# Patient Record
Sex: FEMALE | Race: WHITE | NOT HISPANIC OR LATINO | Employment: OTHER | URBAN - METROPOLITAN AREA
[De-identification: names, ages, dates, MRNs, and addresses within clinical notes are randomized per-mention and may not be internally consistent; named-entity substitution may affect disease eponyms.]

---

## 2017-08-28 ENCOUNTER — HOSPITAL ENCOUNTER (EMERGENCY)
Facility: HOSPITAL | Age: 52
Discharge: HOME | End: 2017-08-28
Attending: EMERGENCY MEDICINE
Payer: MEDICARE

## 2017-08-28 VITALS
SYSTOLIC BLOOD PRESSURE: 103 MMHG | RESPIRATION RATE: 18 BRPM | OXYGEN SATURATION: 96 % | HEIGHT: 65 IN | TEMPERATURE: 97 F | WEIGHT: 118 LBS | BODY MASS INDEX: 19.66 KG/M2 | HEART RATE: 66 BPM | DIASTOLIC BLOOD PRESSURE: 63 MMHG

## 2017-08-28 DIAGNOSIS — H81.399 PERIPHERAL VERTIGO, UNSPECIFIED LATERALITY: ICD-10-CM

## 2017-08-28 DIAGNOSIS — R51.9 GENERALIZED HEADACHE: ICD-10-CM

## 2017-08-28 DIAGNOSIS — R42 VERTIGO: ICD-10-CM

## 2017-08-28 DIAGNOSIS — S09.90XA CLOSED HEAD INJURY, INITIAL ENCOUNTER: Primary | ICD-10-CM

## 2017-08-28 DIAGNOSIS — R55 SYNCOPE, UNSPECIFIED SYNCOPE TYPE: ICD-10-CM

## 2017-08-28 LAB
ALBUMIN SERPL BCP-MCNC: 3.6 G/DL
ALP SERPL-CCNC: 65 U/L
ALT SERPL W/O P-5'-P-CCNC: 31 U/L
ANION GAP SERPL CALC-SCNC: 9 MMOL/L
AST SERPL-CCNC: 23 U/L
BASOPHILS # BLD AUTO: 0.01 K/UL
BASOPHILS NFR BLD: 0.2 %
BILIRUB SERPL-MCNC: 0.8 MG/DL
BILIRUB UR QL STRIP: NEGATIVE
BUN SERPL-MCNC: 16 MG/DL
CALCIUM SERPL-MCNC: 9.2 MG/DL
CHLORIDE SERPL-SCNC: 106 MMOL/L
CLARITY UR: CLEAR
CO2 SERPL-SCNC: 24 MMOL/L
COLOR UR: NORMAL
CREAT SERPL-MCNC: 0.8 MG/DL
DIFFERENTIAL METHOD: ABNORMAL
EOSINOPHIL # BLD AUTO: 0.1 K/UL
EOSINOPHIL NFR BLD: 1.5 %
ERYTHROCYTE [DISTWIDTH] IN BLOOD BY AUTOMATED COUNT: 13.2 %
EST. GFR  (AFRICAN AMERICAN): >60 ML/MIN/1.73 M^2
EST. GFR  (NON AFRICAN AMERICAN): >60 ML/MIN/1.73 M^2
GLUCOSE SERPL-MCNC: 98 MG/DL
GLUCOSE UR QL STRIP: NEGATIVE
HCT VFR BLD AUTO: 38.8 %
HGB BLD-MCNC: 13.2 G/DL
HGB UR QL STRIP: NEGATIVE
KETONES UR QL STRIP: NEGATIVE
LEUKOCYTE ESTERASE UR QL STRIP: NEGATIVE
LYMPHOCYTES # BLD AUTO: 1.6 K/UL
LYMPHOCYTES NFR BLD: 29.9 %
MAGNESIUM SERPL-MCNC: 2.5 MG/DL
MCH RBC QN AUTO: 31.1 PG
MCHC RBC AUTO-ENTMCNC: 34 G/DL
MCV RBC AUTO: 91 FL
MONOCYTES # BLD AUTO: 0.9 K/UL
MONOCYTES NFR BLD: 16.4 %
NEUTROPHILS # BLD AUTO: 2.8 K/UL
NEUTROPHILS NFR BLD: 52 %
NITRITE UR QL STRIP: NEGATIVE
PH UR STRIP: 6 [PH] (ref 5–8)
PLATELET # BLD AUTO: 207 K/UL
PMV BLD AUTO: 11.7 FL
POTASSIUM SERPL-SCNC: 4 MMOL/L
PROT SERPL-MCNC: 6.9 G/DL
PROT UR QL STRIP: NEGATIVE
RBC # BLD AUTO: 4.25 M/UL
SODIUM SERPL-SCNC: 139 MMOL/L
SP GR UR STRIP: 1 (ref 1–1.03)
URN SPEC COLLECT METH UR: NORMAL
UROBILINOGEN UR STRIP-ACNC: NEGATIVE EU/DL
WBC # BLD AUTO: 5.42 K/UL

## 2017-08-28 PROCEDURE — 93005 ELECTROCARDIOGRAM TRACING: CPT

## 2017-08-28 PROCEDURE — 81003 URINALYSIS AUTO W/O SCOPE: CPT

## 2017-08-28 PROCEDURE — 85025 COMPLETE CBC W/AUTO DIFF WBC: CPT

## 2017-08-28 PROCEDURE — 80053 COMPREHEN METABOLIC PANEL: CPT

## 2017-08-28 PROCEDURE — 25000003 PHARM REV CODE 250: Performed by: EMERGENCY MEDICINE

## 2017-08-28 PROCEDURE — 63600175 PHARM REV CODE 636 W HCPCS: Performed by: EMERGENCY MEDICINE

## 2017-08-28 PROCEDURE — 99285 EMERGENCY DEPT VISIT HI MDM: CPT | Mod: 25

## 2017-08-28 PROCEDURE — 83735 ASSAY OF MAGNESIUM: CPT

## 2017-08-28 PROCEDURE — 96365 THER/PROPH/DIAG IV INF INIT: CPT

## 2017-08-28 PROCEDURE — 96375 TX/PRO/DX INJ NEW DRUG ADDON: CPT

## 2017-08-28 PROCEDURE — 96361 HYDRATE IV INFUSION ADD-ON: CPT

## 2017-08-28 RX ORDER — MECLIZINE HYDROCHLORIDE 25 MG/1
25 TABLET ORAL
Status: COMPLETED | OUTPATIENT
Start: 2017-08-28 | End: 2017-08-28

## 2017-08-28 RX ORDER — DIPHENHYDRAMINE HYDROCHLORIDE 50 MG/ML
25 INJECTION INTRAMUSCULAR; INTRAVENOUS
Status: COMPLETED | OUTPATIENT
Start: 2017-08-28 | End: 2017-08-28

## 2017-08-28 RX ORDER — BUTALBITAL, ACETAMINOPHEN AND CAFFEINE 50; 325; 40 MG/1; MG/1; MG/1
2 TABLET ORAL
Status: COMPLETED | OUTPATIENT
Start: 2017-08-28 | End: 2017-08-28

## 2017-08-28 RX ORDER — MECLIZINE HYDROCHLORIDE 25 MG/1
25 TABLET ORAL EVERY 6 HOURS PRN
Qty: 20 TABLET | Refills: 0 | Status: SHIPPED | OUTPATIENT
Start: 2017-08-28 | End: 2018-03-28

## 2017-08-28 RX ORDER — IBUPROFEN 400 MG/1
400 TABLET ORAL EVERY 6 HOURS PRN
Qty: 20 TABLET | Refills: 0 | Status: SHIPPED | OUTPATIENT
Start: 2017-08-28

## 2017-08-28 RX ORDER — PROCHLORPERAZINE MALEATE 10 MG
10 TABLET ORAL EVERY 6 HOURS PRN
Qty: 15 TABLET | Refills: 0 | Status: SHIPPED | OUTPATIENT
Start: 2017-08-28

## 2017-08-28 RX ADMIN — BUTALBITAL, ACETAMINOPHEN AND CAFFEINE 2 TABLET: 50; 325; 40 TABLET ORAL at 03:08

## 2017-08-28 RX ADMIN — DIPHENHYDRAMINE HYDROCHLORIDE 25 MG: 50 INJECTION, SOLUTION INTRAMUSCULAR; INTRAVENOUS at 12:08

## 2017-08-28 RX ADMIN — SODIUM CHLORIDE 1000 ML: 0.9 INJECTION, SOLUTION INTRAVENOUS at 12:08

## 2017-08-28 RX ADMIN — PROMETHAZINE HYDROCHLORIDE 12.5 MG: 25 INJECTION INTRAMUSCULAR; INTRAVENOUS at 12:08

## 2017-08-28 RX ADMIN — MECLIZINE HYDROCHLORIDE 25 MG: 25 TABLET ORAL at 12:08

## 2017-08-28 NOTE — ED TRIAGE NOTES
53 yo female comes in today with the cc of dizziness. Pt also states she is dehydrated. Pt denies any donta, and cp. Pt states she passed out early this morning.

## 2017-08-28 NOTE — ED PROVIDER NOTES
Encounter Date: 2017    SCRIBE #1 NOTE: I, Stephanie Padron, am scribing for, and in the presence of,  Keven Hanson MD. I have scribed the following portions of the note - Other sections scribed: ROS and HPI.       History     Chief Complaint   Patient presents with    Dizziness     States she is dizzy and dehydrated.  States the room feels like it is spinning    Dehydration     CC: Dizziness; Dehydration  HPI: This 52 y.o. female with a past medical history of Fibromyalgia; Kidney stone; Rheumatoid arthritis, presents to the ED complaining of an intermittent episodes of dizziness since this morning at 4 AM. She describes it as room spinning, worse with getting up from lying down. The patient's spouse found patient on a  floor this morning. Patient states she stood up and passed out. She notes hitting back of her head on a wooden floor. She is c/o severe and constant frontal headache. Spouse noted BP of 90/76. She denies any prior similar episode. She reports taking Robitussin last night for her productive cough with clear phelgm and sinus sx. She also notes receiving a steroid shot x1 week ago for her cold sx at her PCP office. She states she has not been eating well for last 2 days. She also reports intermittent nausea. She denies CP, SOB, cough, abdominal pain, V/D or any other associated sx. No prior medical intervention.      The history is provided by the patient and the spouse. No  was used.     Review of patient's allergies indicates:   Allergen Reactions    Codeine Nausea And Vomiting    Penicillins Hives     Past Medical History:   Diagnosis Date    Fibromyalgia     Kidney stone     history    Rheumatoid arthritis 2004    Rheumatoid arthritis      Past Surgical History:   Procedure Laterality Date     SECTION      x1     FOOT SURGERY  2014    HAND FUSION      Right thumb fusion    [facility]    MYOMECTOMY  age 41    TONSILLECTOMY  as an infant      Family History   Problem Relation Age of Onset    Lung cancer Maternal Grandfather     Diabetes Father     Pulmonary embolism Father     Colon cancer Mother     Hypertension Mother     Osteoporosis Mother     Breast cancer Neg Hx     Eclampsia Neg Hx     Miscarriages / Stillbirths Neg Hx     Ovarian cancer Neg Hx      labor Neg Hx     Stroke Neg Hx      Social History   Substance Use Topics    Smoking status: Never Smoker    Smokeless tobacco: Never Used    Alcohol use Yes      Comment: social     Review of Systems   Constitutional: Negative for chills and fever.   HENT: Positive for sneezing. Negative for congestion, ear pain, rhinorrhea and sore throat.    Eyes: Negative for pain and visual disturbance.   Respiratory: Positive for cough. Negative for shortness of breath.    Cardiovascular: Negative for chest pain.   Gastrointestinal: Negative for abdominal pain, diarrhea, nausea and vomiting.   Genitourinary: Negative for dysuria.   Musculoskeletal: Negative for back pain and neck pain.   Skin: Negative for rash.   Neurological: Positive for dizziness, syncope (unwitnessed) and headaches (frontal).       Physical Exam     Initial Vitals [17 1059]   BP Pulse Resp Temp SpO2   109/71 71 18 97.4 °F (36.3 °C) 97 %      MAP       83.67         Physical Exam  The patient was examined specifically for the following:   General:No significant distress, Good color, Warm and dry. Head and neck:Scalp atraumatic, Neck supple. Neurological:Appropriate conversation, Gross motor deficits. Eyes:Conjugate gaze, Clear corneas. ENT: No epistaxis. Cardiac: Regular rate and rhythm, Grossly normal heart tones. Pulmonary: Wheezing, Rales. Gastrointestinal: Abdominal tenderness, Abdominal distention. Musculoskeletal: Extremity deformity, Apparent pain with range of motion of the joints. Skin: Rash.   The findings on examination were normal except for the following: The patient's vertigo can be reproduced by  posture changes during the physical examination.  Tympanic membranes are clear.  Mental status examination, cranial nerves, motor and sensory examination grossly unremarkable.  There is no edema of the left calf.  There is no significant tenderness of the left.  The lungs are clear.  The heart tones are normal.  The abdomen is nontender.  The patient does not seem distressed.  Vital signs are stable.  ED Course   Procedures  Labs Reviewed   CBC W/ AUTO DIFFERENTIAL - Abnormal; Notable for the following:        Result Value    MCH 31.1 (*)     Mono% 16.4 (*)     All other components within normal limits   COMPREHENSIVE METABOLIC PANEL   URINALYSIS   MAGNESIUM     EKG Readings: (Independently Interpreted)   Patient's EKG reveals a normal sinus rhythm with a heart rate is 62.  The NE QRS and QT intervals are normal.  There is no evidence of acute myocardial infarction or malignant arrhythmia.  There are some nonspecific ST and T-wave abnormalities.       X-Rays:   Independently Interpreted Readings:   Other Readings:  CT of the head fails to reveal significant abnormalities.    Medical decision making: Given the above, this patient presents to the emergency room with dizziness.  She gives a report of symptoms of vertigo.  At the same time she gives a report of becoming syncopal in the [place] last night at 4 AM and falling to the ground.  She hit her head.  CT the head fails to reveal any evidence of intracranial bleeding.  Dizziness is the only symptom.  The vertigo can be reproduced during the physical examination.  I favor diagnosis of peripheral vertigo.  Patient's vital signs are stable here.  Laboratory work is unremarkable.  I will discharge this patient outpatient evaluation and treatment on meclizine and lots of liquids and follow-up with family medicine.             Scribe Attestation:   Scribe #1: I performed the above scribed service and the documentation accurately describes the services I performed. I attest  to the accuracy of the note.    Attending Attestation:           Physician Attestation for Scribe:  Physician Attestation Statement for Scribe #1: I, Keven Hanson MD, reviewed documentation, as scribed by Stephanie Padron in my presence, and it is both accurate and complete.                 ED Course     Clinical Impression:   The primary encounter diagnosis was Closed head injury, initial encounter. Diagnoses of Vertigo, Syncope, unspecified syncope type, Peripheral vertigo, unspecified laterality, and Generalized headache were also pertinent to this visit.                           Keven Hanson MD  08/30/17 0983

## 2017-08-28 NOTE — DISCHARGE INSTRUCTIONS
Please return immediately if you get worse or if new problems develop.  Please make an appointment to see your primary care doctor this week.  Rest.  Please hold on to something when you walk.  Please use meclizine for dizziness, ibuprofen for pain.  Compazine for headache and nausea.  Please take Benadryl 25-50 mg every time you use a Compazine tablet.  Rest.

## 2018-03-28 ENCOUNTER — OFFICE VISIT (OUTPATIENT)
Dept: OBSTETRICS AND GYNECOLOGY | Facility: CLINIC | Age: 53
End: 2018-03-28
Payer: MEDICARE

## 2018-03-28 VITALS
DIASTOLIC BLOOD PRESSURE: 78 MMHG | WEIGHT: 123 LBS | BODY MASS INDEX: 20.49 KG/M2 | HEIGHT: 65 IN | SYSTOLIC BLOOD PRESSURE: 112 MMHG

## 2018-03-28 DIAGNOSIS — Z01.419 ENCOUNTER FOR GYNECOLOGICAL EXAMINATION WITHOUT ABNORMAL FINDING: Primary | ICD-10-CM

## 2018-03-28 DIAGNOSIS — R10.2 FEMALE PELVIC PAIN: ICD-10-CM

## 2018-03-28 DIAGNOSIS — N95.1 MENOPAUSAL VASOMOTOR SYNDROME: ICD-10-CM

## 2018-03-28 DIAGNOSIS — Z12.31 SCREENING MAMMOGRAM, ENCOUNTER FOR: ICD-10-CM

## 2018-03-28 PROCEDURE — G0101 CA SCREEN;PELVIC/BREAST EXAM: HCPCS | Mod: S$GLB,,, | Performed by: OBSTETRICS & GYNECOLOGY

## 2018-03-28 PROCEDURE — 99999 PR PBB SHADOW E&M-EST. PATIENT-LVL III: CPT | Mod: PBBFAC,,, | Performed by: OBSTETRICS & GYNECOLOGY

## 2018-03-28 RX ORDER — METHOTREXATE 2.5 MG/1
TABLET ORAL
Refills: 5 | COMMUNITY
Start: 2018-02-09

## 2018-03-28 RX ORDER — ESTRADIOL 2 MG/1
2 TABLET ORAL DAILY
Qty: 30 TABLET | Refills: 11 | Status: SHIPPED | OUTPATIENT
Start: 2018-03-28 | End: 2019-05-02 | Stop reason: SDUPTHER

## 2018-03-28 NOTE — PROGRESS NOTES
Subjective:       Patient ID: Sayra Pearson is a 53 y.o. female.    Chief Complaint:  Well Woman (pt c/o on and off abdominal pains)      History of Present Illness  HPI  Sayra Pearson is a 53 y.o. female  here for her annual GYN exam. She has not been seen for about 2 years.  She reports severe hot flashes for the past year when she ran out of her estradiol, but had started having hot flashes again even on the 1 mg of estradiol.  denies vaginal itching or irritation.  Denies vaginal discharge.  She is sexually active. She has had 1 partner for 10 years .   History of abnormal pap: No  Last Pap: was normal  Last MMG: normal--routine follow-up in 12 months  Last Colonoscopy: 3 years ago: NORMAL  denies domestic violence. She does feel safe at home.     Past Medical History:   Diagnosis Date    Fibromyalgia     Kidney stone     history    Rheumatoid arthritis     Rheumatoid arthritis      Past Surgical History:   Procedure Laterality Date     SECTION      x1     FOOT SURGERY  2014    HAND FUSION      Right thumb fusion    [facility]CTOMY  2011    [facility]    MYOMECTOMY  age 41    TONSILLECTOMY  as an infant     Social History     Social History    Marital status:      Spouse name: N/A    Number of children: N/A    Years of education: N/A     Occupational History    Not on file.     Social History Main Topics    Smoking status: Never Smoker    Smokeless tobacco: Never Used    Alcohol use Yes      Comment: social    Drug use: No    Sexual activity: Yes     Partners: Male     Birth control/ protection: Surgical      Comment:  since      Other Topics Concern    Not on file     Social History Narrative    No narrative on file     Family History   Problem Relation Age of Onset    Lung cancer Maternal Grandfather     Diabetes Father     Pulmonary embolism Father     Colon cancer Mother     Hypertension Mother     Osteoporosis Mother     Breast cancer  "Neg Hx     Eclampsia Neg Hx     Miscarriages / Stillbirths Neg Hx     Ovarian cancer Neg Hx      labor Neg Hx     Stroke Neg Hx      OB History      Para Term  AB Living    3 1 1 0 2 1    SAB TAB Ectopic Multiple Live Births    2 0 0 0 1          /78   Ht 5' 5" (1.651 m)   Wt 55.8 kg (123 lb 0.3 oz)   LMP  (LMP Unknown)   BMI 20.47 kg/m²         GYN & OB History    Date of Last Pap: No result found    OB History    Para Term  AB Living   3 1 1 0 2 1   SAB TAB Ectopic Multiple Live Births   2 0 0 0 1      # Outcome Date GA Lbr Anthony/2nd Weight Sex Delivery Anes PTL Lv   3 SAB            2 SAB            1 Term      CS-LTranv   CINDY          Review of Systems  Review of Systems   Constitutional: Negative for activity change, appetite change, fatigue and unexpected weight change.   HENT: Negative.    Eyes: Negative for visual disturbance.   Respiratory: Negative for shortness of breath and wheezing.    Cardiovascular: Negative for chest pain, palpitations and leg swelling.   Gastrointestinal: Negative for abdominal pain, bloating and blood in stool.   Endocrine: Positive for hot flashes. Negative for diabetes and hair loss.   Genitourinary: Positive for decreased libido, dyspareunia and pelvic pain.   Musculoskeletal: Negative for back pain and joint swelling.   Skin:  Negative for no acne and hair changes.   Neurological: Negative for headaches.   Hematological: Does not bruise/bleed easily.   Psychiatric/Behavioral: Positive for sleep disturbance. Negative for depression. The patient is not nervous/anxious.    Breast: Negative for breast pain and nipple discharge          Objective:    Physical Exam:   Constitutional: She is oriented to person, place, and time. She appears well-developed and well-nourished.    HENT:   Head: Normocephalic and atraumatic.    Eyes: EOM are normal. Pupils are equal, round, and reactive to light.    Neck: Normal range of motion. Neck supple.  "   Cardiovascular: Normal rate and regular rhythm.     Pulmonary/Chest: Effort normal and breath sounds normal.   BREASTS: Symmetrical, no skin changes or visible lesions.  No palpable masses, nipple discharge bilaterally.          Abdominal: Soft. Bowel sounds are normal.     Genitourinary:   Genitourinary Comments: PELVIC: Normal external female genitalia without lesions. Normal hair distribution. Adequate perineal body, normal urethral meatus. Vagina atrophic without lesions or discharge. No significant cystocele or rectocele. Bimanual exam shows uterus and cervix to be surgically absent. Adnexa without masses or tenderness.  RECTAL: Deferred             Musculoskeletal: Normal range of motion and moves all extremeties.       Neurological: She is alert and oriented to person, place, and time.    Skin: Skin is warm and dry.    Psychiatric: She has a normal mood and affect.          Assessment:        1. Encounter for gynecological examination without abnormal finding    2. Screening mammogram, encounter for    3. Female pelvic pain    4. Menopausal vasomotor syndrome               Plan:      1. Encounter for gynecological examination without abnormal finding  COUNSELING:  The patient was counseled today on osteoporosis prevention, calcium supplementation, and regular weight bearing exercise. The patient was also counseled today on ACS PAP guidelines, with recommendations for yearly pelvic exams unless their uterus, cervix, and ovaries were removed for benign reasons; in that case, examinations every 3-5 years are recommended. The patient was also counseled regarding monthly breast self-examination, routine STD screening for at-risk populations, prophylactic immunizations for transmitted infections such as HPV, Pertussis, or Influenza as appropriate, and yearly mammograms when indicated by ACS guidelines. She was advised to see her primary care physician for all other health maintenance.   FOLLOW-UP with me for next  routine visit.         2. Screening mammogram, encounter for    - Mammo Digital Screening Bilat with Tomosynthesis CAD; Future    3. Female pelvic pain    - US Pelvis Comp with Transvag NON-OB (xpd; Future    4. Menopausal vasomotor syndrome    - estradiol (ESTRACE) 2 MG tablet; Take 1 tablet (2 mg total) by mouth once daily.  Dispense: 30 tablet; Refill: 11       Follow-up in about 1 year (around 3/28/2019).

## 2018-04-09 ENCOUNTER — HOSPITAL ENCOUNTER (OUTPATIENT)
Dept: RADIOLOGY | Facility: HOSPITAL | Age: 53
Discharge: HOME | End: 2018-04-09
Attending: OBSTETRICS & GYNECOLOGY
Payer: MEDICARE

## 2018-04-09 DIAGNOSIS — R10.2 FEMALE PELVIC PAIN: ICD-10-CM

## 2018-04-09 PROCEDURE — 76830 TRANSVAGINAL US NON-OB: CPT | Mod: 26,,, | Performed by: RADIOLOGY

## 2018-04-09 PROCEDURE — 76856 US EXAM PELVIC COMPLETE: CPT | Mod: 26,,, | Performed by: RADIOLOGY

## 2018-04-09 PROCEDURE — 76830 TRANSVAGINAL US NON-OB: CPT | Mod: TC

## 2018-08-18 ENCOUNTER — HOSPITAL ENCOUNTER (EMERGENCY)
Facility: HOSPITAL | Age: 53
Discharge: HOME | End: 2018-08-18
Attending: EMERGENCY MEDICINE
Payer: MEDICARE

## 2018-08-18 VITALS
RESPIRATION RATE: 16 BRPM | SYSTOLIC BLOOD PRESSURE: 100 MMHG | TEMPERATURE: 99 F | HEIGHT: 65 IN | DIASTOLIC BLOOD PRESSURE: 63 MMHG | OXYGEN SATURATION: 99 % | HEART RATE: 63 BPM | BODY MASS INDEX: 20.83 KG/M2 | WEIGHT: 125 LBS

## 2018-08-18 DIAGNOSIS — S42.211A CLOSED DISPLACED FRACTURE OF SURGICAL NECK OF RIGHT HUMERUS, UNSPECIFIED FRACTURE MORPHOLOGY, INITIAL ENCOUNTER: Primary | ICD-10-CM

## 2018-08-18 DIAGNOSIS — M25.519 SHOULDER PAIN: ICD-10-CM

## 2018-08-18 LAB — POCT GLUCOSE: 118 MG/DL (ref 70–110)

## 2018-08-18 PROCEDURE — 99284 EMERGENCY DEPT VISIT MOD MDM: CPT | Mod: 25

## 2018-08-18 PROCEDURE — 25000003 PHARM REV CODE 250: Performed by: PHYSICIAN ASSISTANT

## 2018-08-18 PROCEDURE — 96372 THER/PROPH/DIAG INJ SC/IM: CPT

## 2018-08-18 PROCEDURE — 82962 GLUCOSE BLOOD TEST: CPT

## 2018-08-18 PROCEDURE — 63600175 PHARM REV CODE 636 W HCPCS: Performed by: PHYSICIAN ASSISTANT

## 2018-08-18 RX ORDER — ONDANSETRON 8 MG/1
8 TABLET, ORALLY DISINTEGRATING ORAL
Status: COMPLETED | OUTPATIENT
Start: 2018-08-18 | End: 2018-08-18

## 2018-08-18 RX ORDER — MORPHINE SULFATE 4 MG/ML
5 INJECTION, SOLUTION INTRAMUSCULAR; INTRAVENOUS ONCE
Status: COMPLETED | OUTPATIENT
Start: 2018-08-18 | End: 2018-08-18

## 2018-08-18 RX ORDER — OXYCODONE AND ACETAMINOPHEN 5; 325 MG/1; MG/1
1 TABLET ORAL EVERY 4 HOURS PRN
Qty: 20 TABLET | Refills: 0 | Status: SHIPPED | OUTPATIENT
Start: 2018-08-18 | End: 2018-08-23

## 2018-08-18 RX ORDER — ONDANSETRON 4 MG/1
4 TABLET, ORALLY DISINTEGRATING ORAL EVERY 6 HOURS PRN
Qty: 15 TABLET | Refills: 0 | Status: SHIPPED | OUTPATIENT
Start: 2018-08-18 | End: 2018-08-23

## 2018-08-18 RX ORDER — ONDANSETRON 4 MG/1
8 TABLET, FILM COATED ORAL ONCE
Status: DISCONTINUED | OUTPATIENT
Start: 2018-08-18 | End: 2018-08-18

## 2018-08-18 RX ADMIN — ONDANSETRON 8 MG: 8 TABLET, ORALLY DISINTEGRATING ORAL at 01:08

## 2018-08-18 RX ADMIN — MORPHINE SULFATE 5 MG: 4 INJECTION INTRAVENOUS at 12:08

## 2018-08-18 RX ADMIN — SODIUM CHLORIDE 1000 ML: 0.9 INJECTION, SOLUTION INTRAVENOUS at 01:08

## 2018-08-18 NOTE — ED PROVIDER NOTES
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  complaints of right shoulder pain.  denies hitting head.  denies loc.     CC: Fall    HPI:   Patient is a 53-year-old female with history of rheumatoid arthritis fibromyalgia presenting for evaluation of right shoulder pain status post mechanical slip and fall that occurred just prior to arrival.  Patient reports she slipped on the ground and fell directly onto her right shoulder.  Denies head injury, LOC, nausea, vomiting, headache. Patient reports constant right shoulder pain since the incident 5/10, 10/10 with movement. No attempted tx. Denies CP, SOB, lightheadedness or dizziness preceding fall, weakness or paresthesias.           Review of patient's allergies indicates:   Allergen Reactions    Codeine Nausea And Vomiting    Penicillins Hives     Past Medical History:   Diagnosis Date    Fibromyalgia     Kidney stone     history    Rheumatoid arthritis 2004    Rheumatoid arthritis      Past Surgical History:   Procedure Laterality Date     SECTION      x1     FOOT SURGERY  2014    HAND FUSION      Right thumb fusion    [facility]OMY  2011    [facility]    MYOMECTOMY  age 41    TONSILLECTOMY  as an infant     Family History   Problem Relation Age of Onset    Lung cancer Maternal Grandfather     Diabetes Father     Pulmonary embolism Father     Colon cancer Mother     Hypertension Mother     Osteoporosis Mother     Breast cancer Neg Hx     Eclampsia Neg Hx     Miscarriages / Stillbirths Neg Hx     Ovarian cancer Neg Hx      labor Neg Hx     Stroke Neg Hx      Social History     Tobacco Use    Smoking status: Never Smoker    Smokeless tobacco: Never Used   Substance Use Topics    Alcohol use: Yes     Comment: social    Drug use: No     Review of Systems   Eyes: Negative for redness.   Respiratory: Negative for shortness of breath.     Cardiovascular: Negative for chest pain.   Gastrointestinal: Negative for abdominal pain, nausea and vomiting.   Musculoskeletal: Negative for back pain and neck pain.        (+) R shoulder pain     Skin: Negative for wound.   Neurological: Negative for dizziness, weakness, light-headedness, numbness and headaches.       Physical Exam     Initial Vitals [08/18/18 1125]   BP Pulse Resp Temp SpO2   97/72 60 16 98.1 °F (36.7 °C) 97 %      MAP       --         Physical Exam    Nursing note and vitals reviewed.  Constitutional: She appears well-developed and well-nourished. No distress.   HENT:   Head: Normocephalic.   Right Ear: External ear normal.   Left Ear: External ear normal.   Eyes: Conjunctivae are normal.   Cardiovascular: Normal rate and regular rhythm. Exam reveals no gallop and no friction rub.    No murmur heard.  Pulses:       Radial pulses are 2+ on the right side.   Pulmonary/Chest: Breath sounds normal. No respiratory distress. She has no wheezes. She has no rhonchi. She has no rales.   Musculoskeletal:   Pt holding R shoulder adducted and hesitant to move due to pain. No obvious deformity. Generalized TTP over the R shoulder. No TTP of elbow, forearm, wrist, hand. Normal ROM of digits of R hand.    Neurological: She is alert. No sensory deficit.   Skin: Skin is warm and dry.   Psychiatric: She has a normal mood and affect.         ED Course   Orthopedic Injury  Date/Time: 8/18/2018 8:39 PM  Performed by: Shawna Shay PA-C  Authorized by: Keven Hanson MD     Location procedure was performed:  [facility] EMERGENCY DEPARTMENT  Pre-operative diagnosis:  Fracture R humerus surgical neck  Injury:     Injury location:  Shoulder    Location details:  Right shoulder    Injury type:  Fracture      Pre-procedure assessment:     Neurovascular status: Neurovascularly intact      Distal perfusion: normal      Neurological function: normal      Range of motion: reduced        Selections made in this section  will also lock the Injury type section above.:     Immobilization:  Sling (swathe)  Post-procedure assessment:     Neurovascular status: Neurovascularly intact      Distal perfusion: normal      Neurological function: normal      Range of motion: splinted      Patient tolerance:  Patient tolerated the procedure well with no immediate complications      Labs Reviewed   POCT GLUCOSE - Abnormal; Notable for the following components:       Result Value    POCT Glucose 118 (*)     All other components within normal limits          Imaging Results          X-Ray Shoulder Complete 2 View Right (Final result)  Result time 08/18/18 13:00:06    Final result by Rock Chaney MD (08/18/18 13:00:06)                 Impression:      Comminuted impaction fracture of the right humerus surgical neck.      Electronically signed by: Rock Chaney MD  Date:    08/18/2018  Time:    13:00             Narrative:    EXAMINATION:  XR SHOULDER COMPLETE 2 OR MORE VIEWS RIGHT    CLINICAL HISTORY:  Pain in unspecified shoulder    TECHNIQUE:  Two or three views of the right shoulder were performed.    COMPARISON:  None    FINDINGS:  There is a comminuted impaction fracture of the right humerus surgical neck with slight medial displacement of the major distal fracture fragment.    Glenohumeral joint is intact.  Acromioclavicular joint is intact.  No suspicious bone lesion.  Unremarkable soft tissues.                                 Medical Decision Making:   Initial Assessment:   51-year-old female with history of rheumatoid arthritis fibromyalgia not on blood thinners presenting for evaluation of right shoulder pain after slip and fall prior to arrival. Denies head injury. Exam as above. No neurovascular deficits. X-ray remarkable for comminuted impaction fracture of right humerus surgical neck.  Patient given morphine in the ED with improvement of her pain.Sling and Swathe placed. Pt had near syncopal episode during which she became  lightheaded and diaphoretic. She states she has not eaten all day and pain medication makes her nauseous. Denies CP, SOB, difficulty breathing. Pt BP lower limits of normal initially. Slightly hypotensive during this episode. Glucose 118. Pt given IVF and crackers with improvement of symptoms. Patient provided with copy of x-ray on disc. Ortho follow up in 2 days. ER return precautions discussed including worsening symptoms or as needed. Discussed pt with Dr. Montero who agrees with assessment and plan.                       Clinical Impression:   The primary encounter diagnosis was Closed displaced fracture of surgical neck of right humerus, unspecified fracture morphology, initial encounter. A diagnosis of Shoulder pain was also pertinent to this visit.                             Shawna Shay PA-C  08/18/18 1837       Shawna Shay PA-C  08/18/18 2040

## 2018-08-18 NOTE — DISCHARGE INSTRUCTIONS
Take Percocet as needed for pain. Follow up with Orthopedics in 2 days. Return to ER for worsening symptoms, weakness, numbness tingling or as needed.     If you would like to follow up with the [facility] for further care of your  fracture, please call the [facility] Scheduling Department at [phone]  during business hours. Please let the  know you need a fracture follow-up  appointment with Orthopedics, and you will be scheduled in the Orthopedic Clinic.  Please bring your original Emergency Department discharge papers and disc with you to the  clinic appointment.

## 2018-08-18 NOTE — ED TRIAGE NOTES
Pt c/o RIGHT shoulder pain after a fall this AM. Pt states she was mopping when she slipped on the wet floor and landed on right shoulder. Denies hitting head or any other injury from fall. Pain is 9/10. Denies taking pain meds PTA

## 2019-05-02 DIAGNOSIS — N95.1 MENOPAUSAL VASOMOTOR SYNDROME: ICD-10-CM

## 2019-05-03 RX ORDER — ESTRADIOL 2 MG/1
TABLET ORAL
Qty: 30 TABLET | Refills: 1 | Status: SHIPPED | OUTPATIENT
Start: 2019-05-03 | End: 2019-06-05 | Stop reason: SDUPTHER

## 2019-06-05 DIAGNOSIS — N95.1 MENOPAUSAL VASOMOTOR SYNDROME: ICD-10-CM

## 2019-06-05 RX ORDER — ESTRADIOL 2 MG/1
TABLET ORAL
Qty: 30 TABLET | Refills: 1 | Status: SHIPPED | OUTPATIENT
Start: 2019-06-05 | End: 2019-08-29 | Stop reason: SDUPTHER

## 2019-08-29 DIAGNOSIS — N95.1 MENOPAUSAL VASOMOTOR SYNDROME: ICD-10-CM

## 2019-08-29 RX ORDER — ESTRADIOL 2 MG/1
TABLET ORAL
Qty: 30 TABLET | Refills: 0 | Status: SHIPPED | OUTPATIENT
Start: 2019-08-29 | End: 2020-03-16

## 2020-03-16 DIAGNOSIS — N95.1 MENOPAUSAL VASOMOTOR SYNDROME: ICD-10-CM

## 2020-03-16 RX ORDER — ESTRADIOL 2 MG/1
TABLET ORAL
Qty: 30 TABLET | Refills: 1 | Status: SHIPPED | OUTPATIENT
Start: 2020-03-16 | End: 2020-08-06 | Stop reason: SDUPTHER

## 2020-08-06 ENCOUNTER — LAB VISIT (OUTPATIENT)
Dept: LAB | Facility: OTHER | Age: 55
End: 2020-08-06
Attending: OBSTETRICS & GYNECOLOGY
Payer: MEDICARE

## 2020-08-06 ENCOUNTER — TELEPHONE (OUTPATIENT)
Dept: OBSTETRICS AND GYNECOLOGY | Facility: CLINIC | Age: 55
End: 2020-08-06

## 2020-08-06 ENCOUNTER — OFFICE VISIT (OUTPATIENT)
Dept: OBSTETRICS AND GYNECOLOGY | Facility: CLINIC | Age: 55
End: 2020-08-06
Attending: OBSTETRICS & GYNECOLOGY
Payer: MEDICARE

## 2020-08-06 VITALS
SYSTOLIC BLOOD PRESSURE: 108 MMHG | BODY MASS INDEX: 20.68 KG/M2 | WEIGHT: 124.13 LBS | DIASTOLIC BLOOD PRESSURE: 66 MMHG | HEIGHT: 65 IN

## 2020-08-06 DIAGNOSIS — Z01.419 ENCOUNTER FOR GYNECOLOGICAL EXAMINATION WITHOUT ABNORMAL FINDING: Primary | ICD-10-CM

## 2020-08-06 DIAGNOSIS — Z78.0 MENOPAUSE PRESENT: ICD-10-CM

## 2020-08-06 DIAGNOSIS — N95.1 MENOPAUSAL SYNDROME: ICD-10-CM

## 2020-08-06 DIAGNOSIS — L29.0 PERIANAL IRRITATION: ICD-10-CM

## 2020-08-06 DIAGNOSIS — Z12.31 SCREENING MAMMOGRAM, ENCOUNTER FOR: ICD-10-CM

## 2020-08-06 DIAGNOSIS — N94.10 FEMALE DYSPAREUNIA: ICD-10-CM

## 2020-08-06 DIAGNOSIS — N95.1 MENOPAUSAL VASOMOTOR SYNDROME: ICD-10-CM

## 2020-08-06 DIAGNOSIS — N95.2 POSTMENOPAUSAL ATROPHIC VAGINITIS: ICD-10-CM

## 2020-08-06 LAB — ESTRADIOL SERPL-MCNC: 13 PG/ML

## 2020-08-06 PROCEDURE — G0101 CA SCREEN;PELVIC/BREAST EXAM: HCPCS | Mod: S$GLB,,, | Performed by: OBSTETRICS & GYNECOLOGY

## 2020-08-06 PROCEDURE — 84402 ASSAY OF FREE TESTOSTERONE: CPT

## 2020-08-06 PROCEDURE — 36415 COLL VENOUS BLD VENIPUNCTURE: CPT

## 2020-08-06 PROCEDURE — 3008F PR BODY MASS INDEX (BMI) DOCUMENTED: ICD-10-PCS | Mod: CPTII,S$GLB,, | Performed by: OBSTETRICS & GYNECOLOGY

## 2020-08-06 PROCEDURE — 82670 ASSAY OF TOTAL ESTRADIOL: CPT

## 2020-08-06 PROCEDURE — 99999 PR PBB SHADOW E&M-EST. PATIENT-LVL III: CPT | Mod: PBBFAC,,, | Performed by: OBSTETRICS & GYNECOLOGY

## 2020-08-06 PROCEDURE — G0101 PR CA SCREEN;PELVIC/BREAST EXAM: ICD-10-PCS | Mod: S$GLB,,, | Performed by: OBSTETRICS & GYNECOLOGY

## 2020-08-06 PROCEDURE — 3008F BODY MASS INDEX DOCD: CPT | Mod: CPTII,S$GLB,, | Performed by: OBSTETRICS & GYNECOLOGY

## 2020-08-06 PROCEDURE — 99999 PR PBB SHADOW E&M-EST. PATIENT-LVL III: ICD-10-PCS | Mod: PBBFAC,,, | Performed by: OBSTETRICS & GYNECOLOGY

## 2020-08-06 RX ORDER — CLOTRIMAZOLE 1 %
CREAM (GRAM) TOPICAL
Qty: 30 G | Refills: 1 | Status: SHIPPED | OUTPATIENT
Start: 2020-08-06 | End: 2021-08-06

## 2020-08-06 RX ORDER — ESTRADIOL 2 MG/1
2 TABLET ORAL DAILY
Qty: 30 TABLET | Refills: 11 | Status: SHIPPED | OUTPATIENT
Start: 2020-08-06 | End: 2020-08-13 | Stop reason: ALTCHOICE

## 2020-08-06 RX ORDER — TESTOSTERONE 50 MG/5G
0.5 GEL TRANSDERMAL DAILY
Qty: 30 G | Refills: 3 | Status: SHIPPED | OUTPATIENT
Start: 2020-08-06 | End: 2021-05-06 | Stop reason: SDUPTHER

## 2020-08-06 RX ORDER — ESTRADIOL 0.1 MG/G
0.5 CREAM VAGINAL DAILY
Qty: 42 G | Refills: 4 | Status: SHIPPED | OUTPATIENT
Start: 2020-08-06 | End: 2021-08-06

## 2020-08-06 NOTE — TELEPHONE ENCOUNTER
Called pt and scheduled US appt as requested per Dr Zavala. Also put pts AVS in the mail as requested per pt.

## 2020-08-06 NOTE — PROGRESS NOTES
Subjective:       Patient ID: Sayra Pearson is a 55 y.o. female.    Chief Complaint:  Annual Exam      History of Present Illness  HPI  Sayra Pearson is a 55 y.o. female  here for her annual GYN exam.   She reports that her hot flashes have improved, but still having them about once daily (used to be every  15 minutes prior to beginning Estrace tablets).However, her dyspareunia and vaginal dryness have not improved, and lubricants are not working adequately.   denies vaginal itching or irritation.  Denies vaginal discharge.  She is sexually active. She has had 1 partner for the past 12 years .   History of abnormal pap: No  Last Pap: was normal  Last MMG: normal-:July at [facility]-routine follow-up in 12 months  Last Colonoscopy:  N/A  denies domestic violence. She does feel safe at home.     Past Medical History:   Diagnosis Date    Fibromyalgia     Kidney stone     history    Rheumatoid arthritis     Rheumatoid arthritis      Past Surgical History:   Procedure Laterality Date     SECTION      x1     FOOT SURGERY  2014    HAND FUSION      Right thumb fusion    HYSTERECTOMY  2011    [facility]    MYOMECTOMY  age 41    TONSILLECTOMY  as an infant     Social History     Socioeconomic History    Marital status:      Spouse name: Not on file    Number of children: Not on file    Years of education: Not on file    Highest education level: Not on file   Occupational History    Not on file   Social Needs    Financial resource strain: Not on file    Food insecurity     Worry: Not on file     Inability: Not on file    Transportation needs     Medical: Not on file     Non-medical: Not on file   Tobacco Use    Smoking status: Never Smoker    Smokeless tobacco: Never Used   Substance and Sexual Activity    Alcohol use: Yes     Comment: social    Drug use: No    Sexual activity: Yes     Partners: Male     Birth control/protection: Surgical     Comment:  since   "  Lifestyle    Physical activity     Days per week: Not on file     Minutes per session: Not on file    Stress: Not on file   Relationships    Social connections     Talks on phone: Not on file     Gets together: Not on file     Attends [employer] service: Not on file     Active member of club or organization: Not on file     Attends meetings of clubs or organizations: Not on file     Relationship status: Not on file   Other Topics Concern    Not on file   Social History Narrative    Not on file     Family History   Problem Relation Age of Onset    Lung cancer Maternal Grandfather     Diabetes Father     Pulmonary embolism Father     Colon cancer Mother     Hypertension Mother     Osteoporosis Mother     Breast cancer Neg Hx     Eclampsia Neg Hx     Miscarriages / Stillbirths Neg Hx     Ovarian cancer Neg Hx      labor Neg Hx     Stroke Neg Hx      OB History        3    Para   1    Term   1       0    AB   2    Living   1       SAB   2    TAB   0    Ectopic   0    Multiple   0    Live Births   1                 /66   Ht 5' 5" (1.651 m)   Wt 56.3 kg (124 lb 1.9 oz)   LMP  (LMP Unknown)   BMI 20.65 kg/m²         GYN & OB History    Date of Last Pap: No result found    OB History    Para Term  AB Living   3 1 1 0 2 1   SAB TAB Ectopic Multiple Live Births   2 0 0 0 1      # Outcome Date GA Lbr Anthony/2nd Weight Sex Delivery Anes PTL Lv   3 SAB            2 SAB            1 Term      CS-LTranv   CINDY       Review of Systems  Review of Systems   Constitutional: Negative for activity change, appetite change, fatigue and unexpected weight change.   HENT: Negative.    Eyes: Negative for visual disturbance.   Respiratory: Negative for shortness of breath and wheezing.    Cardiovascular: Negative for chest pain, palpitations and leg swelling.   Gastrointestinal: Positive for constipation. Negative for abdominal pain, bloating and blood in stool.   Endocrine: Positive " for hot flashes. Negative for diabetes and hair loss.   Genitourinary: Positive for decreased libido, dyspareunia, hot flashes and vaginal dryness.   Musculoskeletal: Negative for back pain and joint swelling.   Integumentary:  Negative for acne, hair changes and nipple discharge.   Neurological: Negative for headaches.   Hematological: Does not bruise/bleed easily.   Psychiatric/Behavioral: Negative for depression and sleep disturbance. The patient is not nervous/anxious.    Breast: Negative for mastodynia and nipple discharge          Objective:      Physical Exam:   Constitutional: She is oriented to person, place, and time. She appears well-developed and well-nourished.    HENT:   Head: Normocephalic and atraumatic.    Eyes: Pupils are equal, round, and reactive to light. EOM are normal.    Neck: Normal range of motion. Neck supple.    Cardiovascular: Normal rate and regular rhythm.     Pulmonary/Chest: Effort normal and breath sounds normal.   BREASTS:  no mass, no tenderness, no deformity and no retraction. Right breast exhibits no inverted nipple, no mass, no nipple discharge, no skin change, no tenderness, no bleeding and no swelling. Left breast exhibits no inverted nipple, no mass, no nipple discharge, no skin change, no tenderness, no bleeding and no swelling. Breasts are symmetrical.              Abdominal: Soft. Bowel sounds are normal.     Genitourinary:    Pelvic exam was performed with patient supine.      Genitourinary Comments: PELVIC: Normal external female genitalia without lesions. Normal hair distribution. Adequate perineal body, normal urethral meatus. Vagina with Narrow introitus, atrophic without lesions or discharge. No significant cystocele or rectocele. Bimanual exam shows uterus and cervix to be surgically absent. Adnexa without masses or tenderness.(Fullness in culdesac, ? Stool vs. Mass effect).   RECTAL: Deferred (Mild periananal erythema)               Musculoskeletal: Normal range  of motion and moves all extremeties.       Neurological: She is alert and oriented to person, place, and time.    Skin: Skin is warm and dry.    Psychiatric: She has a normal mood and affect.              Assessment:        1. Encounter for gynecological examination without abnormal finding    2. Menopause present    3. Female dyspareunia    4. Postmenopausal atrophic vaginitis    5. Menopausal syndrome    6. Perianal irritation    7. Menopausal vasomotor syndrome    8. Screening mammogram, encounter for                Plan:      1. Encounter for gynecological examination without abnormal finding  COUNSELING:  The patient was counseled today on regular weight bearing exercise. Patient was counseled today on the new ACS guidelines for cervical cytology screening as well as the current recommendations for breast cancer screening. Counseling session lasted approximately 10 minutes, and all her questions were answered. She was advised to see her primary care physician for all other health maintenance.   FOLLOW-UP with me for next routine visit.         2. Menopause present    3. Female dyspareunia    - estradioL (ESTRACE) 0.01 % (0.1 mg/gram) vaginal cream; Place 0.5 g vaginally once daily. Use nightly x 2 weeks then every other day.  Dispense: 42 g; Refill: 4  - US Pelvis Comp with Transvag NON-OB (xpd; Future    4. Postmenopausal atrophic vaginitis    - estradioL (ESTRACE) 0.01 % (0.1 mg/gram) vaginal cream; Place 0.5 g vaginally once daily. Use nightly x 2 weeks then every other day.  Dispense: 42 g; Refill: 4    5. Menopausal syndrome    - Estradiol; Future  - Testosterone, Free; Future  - testosterone (TESTIM) 50 mg/5 gram (1 %) Gel; Apply 0.5 g topically once daily.  Dispense: 30 g; Refill: 3(compounded product called in to [facility])    6. Perianal irritation    - clotrimazole (LOTRIMIN) 1 % cream; Apply to affected area 2 times daily  Dispense: 30 g; Refill: 1    7. Menopausal vasomotor syndrome    - estradioL  (ESTRACE) 2 MG tablet; Take 1 tablet (2 mg total) by mouth once daily.  Dispense: 30 tablet; Refill: 11    8. Screening mammogram, encounter for  - Mammo Digital Screening Bilat w/ Jorge; Future       Follow up in about 1 year (around 8/6/2021), or if symptoms worsen or fail to improve.

## 2020-08-06 NOTE — TELEPHONE ENCOUNTER
----- Message from Katt Blue sent at 8/6/2020 11:59 AM CDT -----  Regarding: Patient call back  Who called:KIM MEDNEZ [[phone]]    What is the request in detail: Patient is requesting a call back. She would like to know if she left too early to receive her paperwork following the visit. She states if it is no longer protocol, she does not need the paperwork. She just wanted to make sure she didn't leave anything behind  Please advise.    Can the clinic reply by [facility]? No    Best call back number: [phone]    Additional Information: N/A

## 2020-08-10 ENCOUNTER — HOSPITAL ENCOUNTER (OUTPATIENT)
Dept: RADIOLOGY | Facility: HOSPITAL | Age: 55
Discharge: HOME | End: 2020-08-10
Attending: OBSTETRICS & GYNECOLOGY
Payer: MEDICARE

## 2020-08-10 DIAGNOSIS — N94.10 FEMALE DYSPAREUNIA: ICD-10-CM

## 2020-08-10 LAB — TESTOST FREE SERPL-MCNC: 0.4 PG/ML

## 2020-08-10 PROCEDURE — 76830 TRANSVAGINAL US NON-OB: CPT | Mod: 26,,, | Performed by: RADIOLOGY

## 2020-08-10 PROCEDURE — 76856 US EXAM PELVIC COMPLETE: CPT | Mod: 26,,, | Performed by: RADIOLOGY

## 2020-08-10 PROCEDURE — 76856 US PELVIS COMP WITH TRANSVAG NON-OB (XPD): ICD-10-PCS | Mod: 26,,, | Performed by: RADIOLOGY

## 2020-08-10 PROCEDURE — 76830 US PELVIS COMP WITH TRANSVAG NON-OB (XPD): ICD-10-PCS | Mod: 26,,, | Performed by: RADIOLOGY

## 2020-08-10 PROCEDURE — 76830 TRANSVAGINAL US NON-OB: CPT | Mod: TC

## 2020-08-12 ENCOUNTER — PATIENT MESSAGE (OUTPATIENT)
Dept: OBSTETRICS AND GYNECOLOGY | Facility: CLINIC | Age: 55
End: 2020-08-12

## 2020-08-13 ENCOUNTER — PATIENT MESSAGE (OUTPATIENT)
Dept: OBSTETRICS AND GYNECOLOGY | Facility: CLINIC | Age: 55
End: 2020-08-13

## 2020-08-13 ENCOUNTER — TELEPHONE (OUTPATIENT)
Dept: OBSTETRICS AND GYNECOLOGY | Facility: CLINIC | Age: 55
End: 2020-08-13

## 2020-08-13 DIAGNOSIS — N95.1 MENOPAUSAL SYNDROME: Primary | ICD-10-CM

## 2020-08-13 RX ORDER — ESTRADIOL 0.1 MG/D
1 FILM, EXTENDED RELEASE TRANSDERMAL
Qty: 24 PATCH | Refills: 4 | Status: SHIPPED | OUTPATIENT
Start: 2020-08-13 | End: 2021-10-04 | Stop reason: SDUPTHER

## 2020-08-13 NOTE — TELEPHONE ENCOUNTER
Called and discussed with patient that she is not absorbing oral estradiol. Will try transdermal.  (blood level of 13 on a 2 mg oral estradiol).     She has resumed having hot flashes, et.

## 2020-08-13 NOTE — TELEPHONE ENCOUNTER
Patient [facility] her vivelle dot patch,as she is not absorbing the estace pills. ON 2 mg, her blood level is < 10.       Will try transdermal.

## 2021-04-15 ENCOUNTER — PATIENT MESSAGE (OUTPATIENT)
Dept: RESEARCH | Facility: HOSPITAL | Age: 56
End: 2021-04-15

## 2021-05-06 DIAGNOSIS — N95.1 MENOPAUSAL SYNDROME: ICD-10-CM

## 2021-05-06 RX ORDER — TESTOSTERONE 50 MG/5G
0.5 GEL TRANSDERMAL DAILY
Qty: 30 G | Refills: 3 | Status: SHIPPED | OUTPATIENT
Start: 2021-05-06 | End: 2021-05-06 | Stop reason: SDUPTHER

## 2021-05-06 RX ORDER — TESTOSTERONE 50 MG/5G
0.5 GEL TRANSDERMAL DAILY
Qty: 30 G | Refills: 3 | Status: SHIPPED | OUTPATIENT
Start: 2021-05-06 | End: 2023-02-01 | Stop reason: ALTCHOICE

## 2021-10-04 ENCOUNTER — OFFICE VISIT (OUTPATIENT)
Dept: OBSTETRICS AND GYNECOLOGY | Facility: CLINIC | Age: 56
End: 2021-10-04
Attending: OBSTETRICS & GYNECOLOGY
Payer: MEDICARE

## 2021-10-04 ENCOUNTER — TELEPHONE (OUTPATIENT)
Dept: OBSTETRICS AND GYNECOLOGY | Facility: CLINIC | Age: 56
End: 2021-10-04

## 2021-10-04 VITALS
DIASTOLIC BLOOD PRESSURE: 63 MMHG | WEIGHT: 123 LBS | BODY MASS INDEX: 20.49 KG/M2 | HEIGHT: 65 IN | SYSTOLIC BLOOD PRESSURE: 107 MMHG

## 2021-10-04 DIAGNOSIS — Z01.419 ENCOUNTER FOR GYNECOLOGICAL EXAMINATION WITHOUT ABNORMAL FINDING: Primary | ICD-10-CM

## 2021-10-04 DIAGNOSIS — Z12.31 SCREENING MAMMOGRAM, ENCOUNTER FOR: Primary | ICD-10-CM

## 2021-10-04 DIAGNOSIS — N95.2 POSTMENOPAUSAL ATROPHIC VAGINITIS: ICD-10-CM

## 2021-10-04 DIAGNOSIS — N95.1 MENOPAUSAL SYNDROME: ICD-10-CM

## 2021-10-04 DIAGNOSIS — Z12.31 SCREENING MAMMOGRAM, ENCOUNTER FOR: ICD-10-CM

## 2021-10-04 PROCEDURE — 1160F PR REVIEW ALL MEDS BY PRESCRIBER/CLIN PHARMACIST DOCUMENTED: ICD-10-PCS | Mod: CPTII,S$GLB,, | Performed by: OBSTETRICS & GYNECOLOGY

## 2021-10-04 PROCEDURE — 3074F SYST BP LT 130 MM HG: CPT | Mod: CPTII,S$GLB,, | Performed by: OBSTETRICS & GYNECOLOGY

## 2021-10-04 PROCEDURE — 3078F DIAST BP <80 MM HG: CPT | Mod: CPTII,S$GLB,, | Performed by: OBSTETRICS & GYNECOLOGY

## 2021-10-04 PROCEDURE — G0101 CA SCREEN;PELVIC/BREAST EXAM: HCPCS | Mod: S$GLB,,, | Performed by: OBSTETRICS & GYNECOLOGY

## 2021-10-04 PROCEDURE — 1160F RVW MEDS BY RX/DR IN RCRD: CPT | Mod: CPTII,S$GLB,, | Performed by: OBSTETRICS & GYNECOLOGY

## 2021-10-04 PROCEDURE — G0101 PR CA SCREEN;PELVIC/BREAST EXAM: ICD-10-PCS | Mod: S$GLB,,, | Performed by: OBSTETRICS & GYNECOLOGY

## 2021-10-04 PROCEDURE — 3008F PR BODY MASS INDEX (BMI) DOCUMENTED: ICD-10-PCS | Mod: CPTII,S$GLB,, | Performed by: OBSTETRICS & GYNECOLOGY

## 2021-10-04 PROCEDURE — 3008F BODY MASS INDEX DOCD: CPT | Mod: CPTII,S$GLB,, | Performed by: OBSTETRICS & GYNECOLOGY

## 2021-10-04 PROCEDURE — 1159F PR MEDICATION LIST DOCUMENTED IN MEDICAL RECORD: ICD-10-PCS | Mod: CPTII,S$GLB,, | Performed by: OBSTETRICS & GYNECOLOGY

## 2021-10-04 PROCEDURE — 1159F MED LIST DOCD IN RCRD: CPT | Mod: CPTII,S$GLB,, | Performed by: OBSTETRICS & GYNECOLOGY

## 2021-10-04 PROCEDURE — 3074F PR MOST RECENT SYSTOLIC BLOOD PRESSURE < 130 MM HG: ICD-10-PCS | Mod: CPTII,S$GLB,, | Performed by: OBSTETRICS & GYNECOLOGY

## 2021-10-04 PROCEDURE — 3078F PR MOST RECENT DIASTOLIC BLOOD PRESSURE < 80 MM HG: ICD-10-PCS | Mod: CPTII,S$GLB,, | Performed by: OBSTETRICS & GYNECOLOGY

## 2021-10-04 RX ORDER — ESTRADIOL 0.1 MG/D
1 FILM, EXTENDED RELEASE TRANSDERMAL
Qty: 24 PATCH | Refills: 4 | Status: SHIPPED | OUTPATIENT
Start: 2021-10-04 | End: 2023-02-01 | Stop reason: SDUPTHER

## 2022-04-19 DIAGNOSIS — N95.1 MENOPAUSAL SYNDROME: ICD-10-CM

## 2023-02-01 ENCOUNTER — OFFICE VISIT (OUTPATIENT)
Dept: OBSTETRICS AND GYNECOLOGY | Facility: CLINIC | Age: 58
End: 2023-02-01
Payer: MEDICARE

## 2023-02-01 VITALS
BODY MASS INDEX: 19.43 KG/M2 | WEIGHT: 116.63 LBS | DIASTOLIC BLOOD PRESSURE: 76 MMHG | SYSTOLIC BLOOD PRESSURE: 124 MMHG | HEIGHT: 65 IN

## 2023-02-01 DIAGNOSIS — N95.1 INSOMNIA ASSOCIATED WITH MENOPAUSE: ICD-10-CM

## 2023-02-01 DIAGNOSIS — N94.10 FEMALE DYSPAREUNIA: ICD-10-CM

## 2023-02-01 DIAGNOSIS — Z01.419 ENCOUNTER FOR GYNECOLOGICAL EXAMINATION WITHOUT ABNORMAL FINDING: Primary | ICD-10-CM

## 2023-02-01 DIAGNOSIS — N95.1 MENOPAUSAL SYNDROME: ICD-10-CM

## 2023-02-01 DIAGNOSIS — N95.2 POSTMENOPAUSAL ATROPHIC VAGINITIS: ICD-10-CM

## 2023-02-01 PROCEDURE — 1159F MED LIST DOCD IN RCRD: CPT | Mod: CPTII,S$GLB,, | Performed by: OBSTETRICS & GYNECOLOGY

## 2023-02-01 PROCEDURE — 3074F PR MOST RECENT SYSTOLIC BLOOD PRESSURE < 130 MM HG: ICD-10-PCS | Mod: CPTII,S$GLB,, | Performed by: OBSTETRICS & GYNECOLOGY

## 2023-02-01 PROCEDURE — 3074F SYST BP LT 130 MM HG: CPT | Mod: CPTII,S$GLB,, | Performed by: OBSTETRICS & GYNECOLOGY

## 2023-02-01 PROCEDURE — 99999 PR PBB SHADOW E&M-EST. PATIENT-LVL III: ICD-10-PCS | Mod: PBBFAC,,, | Performed by: OBSTETRICS & GYNECOLOGY

## 2023-02-01 PROCEDURE — G0101 PR CA SCREEN;PELVIC/BREAST EXAM: ICD-10-PCS | Mod: GZ,S$GLB,, | Performed by: OBSTETRICS & GYNECOLOGY

## 2023-02-01 PROCEDURE — 3008F PR BODY MASS INDEX (BMI) DOCUMENTED: ICD-10-PCS | Mod: CPTII,S$GLB,, | Performed by: OBSTETRICS & GYNECOLOGY

## 2023-02-01 PROCEDURE — 99999 PR PBB SHADOW E&M-EST. PATIENT-LVL III: CPT | Mod: PBBFAC,,, | Performed by: OBSTETRICS & GYNECOLOGY

## 2023-02-01 PROCEDURE — 1159F PR MEDICATION LIST DOCUMENTED IN MEDICAL RECORD: ICD-10-PCS | Mod: CPTII,S$GLB,, | Performed by: OBSTETRICS & GYNECOLOGY

## 2023-02-01 PROCEDURE — G0101 CA SCREEN;PELVIC/BREAST EXAM: HCPCS | Mod: GZ,S$GLB,, | Performed by: OBSTETRICS & GYNECOLOGY

## 2023-02-01 PROCEDURE — 3078F DIAST BP <80 MM HG: CPT | Mod: CPTII,S$GLB,, | Performed by: OBSTETRICS & GYNECOLOGY

## 2023-02-01 PROCEDURE — 3078F PR MOST RECENT DIASTOLIC BLOOD PRESSURE < 80 MM HG: ICD-10-PCS | Mod: CPTII,S$GLB,, | Performed by: OBSTETRICS & GYNECOLOGY

## 2023-02-01 PROCEDURE — 3008F BODY MASS INDEX DOCD: CPT | Mod: CPTII,S$GLB,, | Performed by: OBSTETRICS & GYNECOLOGY

## 2023-02-01 RX ORDER — ESTRADIOL 0.1 MG/G
1 CREAM VAGINAL EVERY OTHER DAY
Qty: 42 G | Refills: 4 | Status: SHIPPED | OUTPATIENT
Start: 2023-02-01 | End: 2023-02-02

## 2023-02-01 RX ORDER — ESTRADIOL 0.1 MG/D
1 FILM, EXTENDED RELEASE TRANSDERMAL
Qty: 24 PATCH | Refills: 4 | Status: SHIPPED | OUTPATIENT
Start: 2023-02-02 | End: 2023-06-27 | Stop reason: DRUGHIGH

## 2023-02-01 RX ORDER — TRAZODONE HYDROCHLORIDE 50 MG/1
50 TABLET ORAL NIGHTLY
Qty: 30 TABLET | Refills: 2 | Status: SHIPPED | OUTPATIENT
Start: 2023-02-01 | End: 2024-02-01

## 2023-02-01 RX ORDER — ESTRADIOL 0.1 MG/G
1 CREAM VAGINAL EVERY OTHER DAY
Qty: 42 G | Refills: 4 | OUTPATIENT
Start: 2023-02-01 | End: 2023-02-01 | Stop reason: SDUPTHER

## 2023-02-01 RX ORDER — ESTRADIOL 0.1 MG/D
1 FILM, EXTENDED RELEASE TRANSDERMAL
Qty: 24 PATCH | Refills: 4 | OUTPATIENT
Start: 2023-02-02 | End: 2023-02-01 | Stop reason: RX

## 2023-02-01 RX ORDER — ERGOCALCIFEROL 1.25 MG/1
50000 CAPSULE ORAL
COMMUNITY
Start: 2022-12-28

## 2023-02-01 NOTE — PROGRESS NOTES
Subjective:       Patient ID: Sayra Pearson is a 58 y.o. female.    Chief Complaint:  Annual Exam, Gynecologic Exam, Dyspareunia, and Menopausal syndrome      History of Present Illness  Gynecologic Exam  Pertinent negatives include no abdominal pain, back pain or headaches.   Sayra Pearson is a 58 y.o. female  here for her annual GYN exam.   She has not been seen in almost 2 years. She reports continued Deep Dyspareunia, and admits to not using her vaginal cream regularly as she was running out. Has begun to have hot flashes again since she was running out of her patches, and was using only once instead of twice weekly in order to extend the prescription. (We discussed [facility] PT as well as need for consistent use of vaginal estrogen. )  She is menopausal since age 51. (Had a hysterectomy for fibroids )  denies vaginal itching or irritation.  Denies  vaginal discharge.  She is sexually active. She has had1 partner for 15 years .   History of abnormal pap: No  Last Pap:  had a Hysterectomy.  [facility]: [facility]- at [facility]-routine follow-up in 12 months  Last Colonoscopy:  NA  denies domestic violence. She does feel safe at home.     Past Medical History:   Diagnosis Date    Fibromyalgia     Kidney stone     history    Rheumatoid arthritis     Rheumatoid arthritis      Past Surgical History:   Procedure Laterality Date     SECTION      x1     FOOT SURGERY  2014    HAND FUSION      Right thumb fusion    [facility]STERECTOMY      [facility]    MYOMECTOMY  age 41    TONSILLECTOMY  as an infant     Social History     Socioeconomic History    Marital status:    Tobacco Use    Smoking status: Never    Smokeless tobacco: Never   Substance and Sexual Activity    Alcohol use: Yes     Comment: social    Drug use: No    Sexual activity: Yes     Partners: Male     Birth control/protection: Surgical     Comment:  since      Family History   Problem Relation Age of Onset    Lung  "cancer Maternal Grandfather     Diabetes Father     Pulmonary embolism Father     Colon cancer Mother     Hypertension Mother     Osteoporosis Mother     Breast cancer Neg Hx     Eclampsia Neg Hx     Miscarriages / Stillbirths Neg Hx     Ovarian cancer Neg Hx      labor Neg Hx     Stroke Neg Hx      OB History          3    Para   1    Term   1       0    AB   2    Living   1         SAB   2    IAB   0    Ectopic   0    Multiple   0    Live Births   1                 /76   Ht 5' 5" (1.651 m)   Wt 52.9 kg (116 lb 10 oz)   LMP  (LMP Unknown)   BMI 19.41 kg/m²         GYN & OB History    Date of Last Pap: No result found    OB History    Para Term  AB Living   3 1 1 0 2 1   SAB IAB Ectopic Multiple Live Births   2 0 0 0 1      # Outcome Date GA Lbr Anthony/2nd Weight Sex Delivery Anes PTL Lv   3 SAB            2 SAB            1 Term      CS-LTranv   CINDY       Review of Systems  Review of Systems   Constitutional:  Negative for activity change, appetite change, fatigue and unexpected weight change.   HENT: Negative.     Eyes:  Negative for visual disturbance.   Respiratory:  Negative for shortness of breath and wheezing.    Cardiovascular:  Negative for chest pain, palpitations and leg swelling.   Gastrointestinal:  Negative for abdominal pain, bloating and blood in stool.   Endocrine: Positive for hair loss. Negative for diabetes.   Genitourinary:  Positive for decreased libido, dyspareunia, hot flashes and vaginal dryness.   Musculoskeletal:  Negative for back pain and joint swelling.   Integumentary:  Negative for acne, hair changes and nipple discharge.   Neurological:  Negative for headaches.   Hematological:  Does not bruise/bleed easily.   Psychiatric/Behavioral:  Negative for depression and sleep disturbance. The patient is nervous/anxious.    Breast: Negative for mastodynia and nipple discharge        Objective:      Physical Exam:   Constitutional: She is oriented to " person, place, and time. She appears well-developed and well-nourished.    HENT:   Head: Normocephalic and atraumatic.    Eyes: Pupils are equal, round, and reactive to light. EOM are normal.     Cardiovascular:  Normal rate and regular rhythm.             Pulmonary/Chest: Effort normal and breath sounds normal.   BREASTS:  no mass, no tenderness, no deformity and no retraction. Right breast exhibits no inverted nipple, no mass, no nipple discharge, no skin change, no tenderness, no bleeding and no swelling. Left breast exhibits no inverted nipple, no mass, no nipple discharge, no skin change, no tenderness, no bleeding and no swelling. Breasts are symmetrical.              Abdominal: Soft. Bowel sounds are normal.     Genitourinary:    Pelvic exam was performed with patient supine.      Genitourinary Comments: PELVIC: Normal external female genitalia without lesions. Normal hair distribution. Adequate perineal body, normal urethral meatus. Vagina Moderately well rugated without lesions or discharge. No significant cystocele or rectocele. Bimanual exam shows uterus and cervix to be surgically absent. Adnexa without masses or tenderness.  RECTAL: Deferred                 Musculoskeletal: Normal range of motion and moves all extremeties.       Neurological: She is alert and oriented to person, place, and time.    Skin: Skin is warm and dry.    Psychiatric: She has a normal mood and affect.            Assessment:        1. Encounter for gynecological examination without abnormal finding    2. Menopausal syndrome    3. Female dyspareunia    4. Postmenopausal atrophic vaginitis    5. Insomnia associated with menopause                Plan:      Problem List Items Addressed This Visit    None  Visit Diagnoses       Encounter for gynecological examination without abnormal finding    -  Primary  COUNSELING:  The patient was counseled today on regular weight bearing exercise. Patient was counseled today on the new ACS  guidelines for cervical cytology screening as well as the current recommendations for breast cancer screening. Counseling session lasted approximately 10 minutes, and all her questions were answered. She was advised to see her primary care physician for all other health maintenance.   FOLLOW-UP with me for next routine visit.       Menopausal syndrome        Relevant Medications    estradioL (VIVELLE-DOT) 0.1 mg/24 hr PTSW (Start on 2/2/2023)    UNABLE TO FIND(Testosterone cream 1%.  Apply one pump to the labia minora (which are the lips on the vulva without hair around the vaginal opening) every evening before bed.  Do not wear underwear to avoid removal.  .  Also, please wash hands thoroughly after application to avoid transfer to pets or children.      Female dyspareunia        Relevant Orders    Ambulatory referral/consult to Physical/Occupational Therapy  Discussed CBD Vaginal suppositories    Postmenopausal atrophic vaginitis        Relevant Medications    estradioL (ESTRACE) 0.01 % (0.1 mg/gram) vaginal cream    Insomnia associated with menopause        Relevant Medications    traZODone (DESYREL) 50 MG tablet(take 1/2 tablet 1/2 hour before bedtime prn)             Follow up in about 3 months (around 5/1/2023), or if symptoms worsen or fail to improve.

## 2023-02-02 DIAGNOSIS — N95.2 POSTMENOPAUSAL ATROPHIC VAGINITIS: ICD-10-CM

## 2023-02-02 RX ORDER — ESTRADIOL 0.1 MG/G
1 CREAM VAGINAL EVERY OTHER DAY
Qty: 42 G | Refills: 4 | Status: SHIPPED | OUTPATIENT
Start: 2023-02-02 | End: 2023-06-27 | Stop reason: DRUGHIGH

## 2023-02-07 ENCOUNTER — CLINICAL SUPPORT (OUTPATIENT)
Dept: REHABILITATION | Facility: HOSPITAL | Age: 58
End: 2023-02-07
Attending: OBSTETRICS & GYNECOLOGY
Payer: MEDICARE

## 2023-02-07 DIAGNOSIS — N94.10 FEMALE DYSPAREUNIA: Primary | ICD-10-CM

## 2023-02-07 DIAGNOSIS — M62.81 MUSCLE WEAKNESS: ICD-10-CM

## 2023-02-07 DIAGNOSIS — R27.9 LACK OF COORDINATION: ICD-10-CM

## 2023-02-07 DIAGNOSIS — M62.89 MUSCLE HYPERTONICITY: ICD-10-CM

## 2023-02-07 PROCEDURE — 97140 MANUAL THERAPY 1/> REGIONS: CPT | Mod: PN

## 2023-02-07 PROCEDURE — 97162 PT EVAL MOD COMPLEX 30 MIN: CPT | Mod: PN

## 2023-02-07 PROCEDURE — 97112 NEUROMUSCULAR REEDUCATION: CPT | Mod: PN

## 2023-02-08 PROBLEM — R27.9 LACK OF COORDINATION: Status: ACTIVE | Noted: 2023-02-08

## 2023-02-08 PROBLEM — M62.81 MUSCLE WEAKNESS: Status: ACTIVE | Noted: 2023-02-08

## 2023-02-08 PROBLEM — M62.89 MUSCLE HYPERTONICITY: Status: ACTIVE | Noted: 2023-02-08

## 2023-02-08 PROBLEM — M62.89 MUSCLE HYPERTONICITY: Status: ACTIVE | Noted: 2023-02-07

## 2023-02-08 PROBLEM — R27.9 LACK OF COORDINATION: Status: ACTIVE | Noted: 2023-02-07

## 2023-02-08 PROBLEM — M62.81 MUSCLE WEAKNESS: Status: ACTIVE | Noted: 2023-02-07

## 2023-02-08 NOTE — PROGRESS NOTES
Physical Therapy Pelvic Floor Evaluation    Patient Name: Sayra Pearson   YOB: 1965  MRN: [id number]   Date of visit: 2023  Diagnosis:  Encounter Diagnoses   Name Primary?    Female dyspareunia Yes    Lack of coordination     Muscle hypertonicity     Muscle weakness        Referring Provider: Susana Zavala MD    Re-eval due: @M+3@     This patient is appropriate for physical therapy at this time.    Functional Measure: ***      Time In: 3:12 pm  Time Out: 4:22 pm      SUBJECTIVE  Chief complaint: dyspareunia    Current signs and symptoms: Pain with intercourse has been an issue for the past 2 years. Prior to 2 years ago, she did not have any issues. Reports that she is uday-menopausal and believes this is the contributing factor to her increased pain.     Medical History: rheumatoid arthritis and fibromyalgia    Surgical History: Partial hysterectomy 10 years ago.  in .      BLADDER  Frequency:    Day: about every 2 hours    Night: none  Urgency: none  Incontinence: none  Water intake: 3-4 standard sized water bottles per day  Stream: typical  Fully emptying: yes   Strain: no  Pain: none  Notes: none    BOWEL  Frequency: daily  Urgency: none  Incontinence: none  Stool type: formed  Strain: no  Pain: no  Supplements/fiber/laxatives: n/a    PAIN:  Location: deep vaginal canal only with intercourse. Reports recent rib pain following a cough.   Intensity: 10  Description: Sharp  Notes: reports that the pelvic pain stays the same during entire session. Has tried multiple solutions and none have worked. Has tried a CBD oil and it did provide some mild benefit but it did not last long. Is using an estrogen cream prescribed by provider. Is attempting intercourse at least once per week.     BIRTH HISTORY:  in . Had 2 miscarriages via vaginal delivery.       OBJECTIVE:  Posture:forward head/rounded shoulders  Breathing pattern: severe difficulty coordinating diaphragmatic  breathing pattern. Typical pattern is using upper chest paradoxical breathing pattern.     Lower extremity Strength:   Hip flexion Right: 3+/5 Left: 3+/5  Hip abduction Right: 3+/5 Left: 3+/5  Hip adduction Right: 3+/5 Left: 3+/5  Hip extension Right: 3+/5 Left: 3+/5    Abdominal Strength: 3/5  Diastasis: none    Palpation: increased tension/tone noted throughout entire abdomen. Reports increased tenderness to upper abdomen inferior to sternum. Increased rib angle.     Movement: patient with visible pain with supine<>sit. Reports that a left upper rib is very painful with these movements. Therapist gave cues for TA approximation and she has good coordination of this. TA approximation with transfer resolved pain.       ASSESSMENT  Sayra is a 58 y.o. female referred to outpatient pelvic floor physical therapy and presents with a medical diagnosis of dyspareunia. She demonstrates limitations as described in the problem list. These impairments are limiting the patient's ability to participate comfortably in recreational activities.    Problem List: altered posture, poor knowledge of body mechanics and posture, and increased tension of the pelvic muscles, and paradoxical breathing pattern      TREATMENT  Moderate complexity evaluation  Neuromuscular re-education: coordination and education on diaphragmatic breathing technique in supine and sitting. Coordination of TA approximation in supine and sitting and with transfers.   Manual therapy: rib compression/axial approximation      PLAN  Therapist recommends the patient for continued treatment at 1-2 times per week for 8-12 weeks to address impaired coordination, increased muscle tone, and muscle weakness. Treatment plan will include Manual Therapy, Moist Heat/ Ice, Neuromuscular Re-ed, Patient Education, Therapeutic Activities, and Therapeutic Exercise      Mckayla Muñoz PT, DPT

## 2023-02-08 NOTE — PLAN OF CARE
[facility]   Pelvic Health Physical Therapy Initial Evaluation     Date: 2/7/2023   Name: Sayra Pearson  Clinic Number: [id number]    Therapy Diagnosis:   Encounter Diagnoses   Name Primary?    Female dyspareunia Yes    Lack of coordination     Muscle hypertonicity     Muscle weakness      Physician: Susana Zavala MD    Physician Orders: PT Eval and Treat   Medical Diagnosis from Referral: female dyspareunia  Evaluation Date: 2/7/2023  Authorization Period Expiration: 03/07/2023  Plan of Care Expiration: 5/7/2023  Progress Note Due: visit #1  Visit # / Visits authorized: 1/ 1   FOTO: Issued Visit #: 1     Precautions:  n/a    Time In: 3:12  Time Out: 4:22  Total Appointment Time (timed & untimed codes): 70 minutes    SUBJECTIVE     Date of onset: 2/7/2021    History of current condition - Sayra reports: pain with intercourse starting 2 years ago.     OB/GYN History: vaginal delivery, caesarean, painful vaginal penetration, and pelvic pain  Sexually active? Yes  Pain with vaginal exams, intercourse or tampon use? Yes    Bladder/Bowel History:   Frequency of urination:   Daytime: every 2 hour s          Nighttime: none  Difficulty initiating urine stream: No  Urine stream: strong  Complete emptying: Yes  Bladder leakage: No  Frequency of incidents: none  Amount leaked (urine):  none  Urinary Urgency: No, Able to delay the urge for at least 10-15 minute(s).  Frequency of bowel movements: once a day  Difficulty initiating BM: No  Quality/Shape of BM: [place] Stool Chart 3-4  Does Patient Feel Empty after BM? Yes  Fiber Supplements or Laxative Use? No  Colon leakage: No  Frequency of incidents: none   Amount leaked (bowels):  none  Form of protection: none  Number of pads required in 24 hours: none    Pain:  Location: groin, deep vaginal canal  Current 0/10, worst 10/10, best 0/10   Description: Sharp  Aggravating Factors/Activities that cause symptoms: Vaginal exam/provocation  "  Easing Factors: nothing, has tried CBD oil with minimal effects    Prior Therapy: none  Social History:  lives with their spouse  Prior Level of Function: able to participate in all ADLs and IADLs  Current Level of Function: unable to participate in intercourse due to pain    Types of fluid intake: water  Diet: mostly plant based   Habitus: well developed, well nourished  Abuse/Neglect: No     Patients goals: "to not have this pain anymore"     Medical History: Sayra  has a past medical history of Fibromyalgia, Kidney stone, Rheumatoid arthritis (), and Rheumatoid arthritis.     Surgical History: Sayra Pearson  has a past surgical history that includes Tonsillectomy (as an infant); Foot surgery (2014);  section; Myomectomy (age 41); Hysterectomy (); and Hand Fusion.    Medications: Sayra has a current medication list which includes the following prescription(s): adalimumab, clotrimazole, ergocalciferol, estradiol, estradiol, folic acid, gabapentin, ibuprofen, methotrexate, methotrexate (anti-rheumatic), prochlorperazine, trazodone, and UNABLE TO FIND.    Allergies:   Review of patient's allergies indicates:   Allergen Reactions    Codeine Nausea And Vomiting    Penicillins Hives        OBJECTIVE     See EMR under MEDIA for written consent provided 2023  Chaperone: declined    ORTHO SCREEN  Posture in sitting: sits squarely   Posture in standing: forward and rounded shoulders   Pelvic alignment: Not assessed today    SI Joint Palpation: Denies tenderness to SI joint palpation bilaterally.  Adductor Palpation: increased tension     ABDOMINAL WALL ASSESSMENT  Palpation: tender, increased tension , and trigger points  Abdominal strength: Rectus abdominus: 3/5     Transverse abdominus: 3/5  Scarring: not assessed today  Pelvic Floor Muscle and Transverse Abdominus Synergy: absent  Diastasis: not assessed today      BREATHING MECHANICS ASSESSMENT   Thorax Assessment During Quiet " Respiration: Decreased excursion of abdominal wall , Decreased excursion bilaterally of lateral ribs , and Excessive excursion of sternum  Thorax Assessment During Deep Respiration: Decreased excursion of abdominal wall , Decreased excursion bilaterally of lateral ribs , and Excessive excursion of sternum    VAGINAL PELVIC FLOOR EXAM  Patient declined vaginal and rectal assessment today. But is open to attempting at next appointment.       Limitation/Restriction for FOTO PFDI Prolapse Survey    Therapist reviewed FOTO scores for Sayra Pearson on 2/7/2023.   FOTO documents entered into [employer] - see Media section.    Limitation Score: 17%       TREATMENT     Total Treatment time (time-based codes) separate from Evaluation: 45 minutes     Neuromuscular Re-education to improve Coordination and Down training for 30 minutes including:   abdominal sets and diaphragmatic breathing    Manual Therapy to improve extensibility and desensitization for 15 minutes including:   joint mobilizations of lateral ribs in inferior direction    PATIENT EDUCATION AND HOME EXERCISES     Education provided:   general anatomy/physiology of urinary/ bowel  system, benefits of treatment, risks of treatment, and alternative methods of treatment were discussed with the patient. Additionally, posture/body mechanices and diaphragmatic breathing were reviewed.     Written Home Exercises provided: yes.  Exercises were reviewed and Sayra was able to demonstrate them prior to the end of the session. Sayra demonstrated good  understanding of the education provided. See EMR under Patient Instructions for exercises provided during therapy sessions. HEP included: 3-5 minutes of diaphragmatic breathing in supine, self inferior rib mobilizations with breathing x4.    ASSESSMENT     Sayra is a 58 y.o. female referred to outpatient Physical Therapy with a medical diagnosis of female dyspareunia. Pt presents with impaired motor coordination, decreased  muscle strength, and increased muscle tone.     Patient prognosis is Good.   Patient will benefit from skilled outpatient Physical Therapy to address the deficits stated above and in the chart below, provide patient/family education, and to maximize patient's level of independence.     Plan of care discussed with patient: Yes  Patient's spiritual, cultural and educational needs considered and patient is agreeable to the plan of care and goals as stated below:     Anticipated Barriers for therapy: n/a    Medical Necessity is demonstrated by the following:  History  Co-morbidities and personal factors that may impact the plan of care Co-morbidities   prior abdominal surgery and prior pelvic surgery    Personal Factors  no deficits     moderate   Examination  Body structures and functions, activity limitations and participation restrictions that may impact the plan of care Body Regions/Systems/Functions:  altered posture, poor knowledge of body mechanics and posture, increased tension of the pelvic muscles, poor quality of pelvic muscle contraction, and unable to co-contract or co-relax abdominal wall and pelvic floor muscles     Activity Limitations:  intercourse/vaginal exam/tampon use without pain    Participation Restrictions:  relationship with spouse/partner    Activity limitations:   Learning and applying knowledge  no deficits    General Tasks and Commands  no deficits    Communication  communicating with/receiving spoken language    Mobility  no deficits    Self care  no deficits    Domestic Life  no deficits    Interactions/Relationships  no deficits    Life Areas  no deficits    Community and Social Life  no deficits       low   Clinical Presentation evolving clinical presentation with changing clinical characteristics moderate   Decision Making/ Complexity Score: moderate     Goals:  Short Term Goals: 4 weeks   Pt will verbalize improved awareness of PFM activity as palpated by PT in order to improve  activity involvement with HEP.  Pt will report improved ability to engage pelvic/abdominal brace with < or = 2/10 pain for improved tolerance of functional transfers/mobility.   Pt/family will be independent with HEP for continued self-management of symptoms.    Long Term Goals: 12 weeks   Pt will report successfully having intercourse with < or = 2/10 pain for an improvement in activity tolerance.   Pt will demonstrate gross lower extremity strength and core strength of 4/5 or greater to improve functional tolerance.     PLAN     Plan of care Certification: 2/7/2023 to 5/7/2023.    Outpatient Physical Therapy 1-2 time(s) every 1 week(s) for 8-12 weeks to include the following interventions: Manual Therapy, Moist Heat/ Ice, Neuromuscular Re-ed, Patient Education, Self Care, Therapeutic Activities, and Therapeutic Exercise.     Mckayla Muñoz, PT      I CERTIFY THE NEED FOR THESE SERVICES FURNISHED UNDER THIS PLAN OF TREATMENT AND WHILE UNDER MY CARE   Physician's comments:     Physician's Signature: ___________________________________________________

## 2023-02-13 ENCOUNTER — CLINICAL SUPPORT (OUTPATIENT)
Dept: REHABILITATION | Facility: HOSPITAL | Age: 58
End: 2023-02-13
Attending: OBSTETRICS & GYNECOLOGY
Payer: MEDICARE

## 2023-02-13 DIAGNOSIS — M62.89 MUSCLE HYPERTONICITY: ICD-10-CM

## 2023-02-13 DIAGNOSIS — N94.10 FEMALE DYSPAREUNIA: Primary | ICD-10-CM

## 2023-02-13 DIAGNOSIS — M62.81 MUSCLE WEAKNESS: ICD-10-CM

## 2023-02-13 DIAGNOSIS — R27.9 LACK OF COORDINATION: ICD-10-CM

## 2023-02-13 PROCEDURE — 97530 THERAPEUTIC ACTIVITIES: CPT | Mod: PN

## 2023-02-13 PROCEDURE — 97112 NEUROMUSCULAR REEDUCATION: CPT | Mod: PN

## 2023-02-13 NOTE — PROGRESS NOTES
Outpatient Pelvic Health Physical Therapy Daily Note         Patient Name: Sayra Pearson  Clinic Number: [id number]     Therapy Diagnosis:        Encounter Diagnoses   Name Primary?    Female dyspareunia Yes    Lack of coordination      Muscle hypertonicity      Muscle weakness        Physician: Susana Zavala MD     Visit Date: 2/13/2023     Physician Orders: Physical Therapy evaluate and treat  Medical Diagnosis: female dyspareunia  Evaluation Date: 2/7/2023  Authorization Period Expiration: 3/7/2023  Plan of Care Certification Period: 2/7/2023 - 5/7/2023  Visit #/Visits authorized: 1/ 11 (plus evaluation)      Time In: 9:03  Time Out: 10:09  Total Billable Time: 66 minutes     Precautions: Standard     SUBJECTIVE   Pt reports: that her ribs are feeling better. Has not attempted intercourse so no changes report.   She was compliant with home exercise program.  Response to previous treatment: good  Functional change: Reports that her ribs are feeling a lot better. Is having an easier time with getting in and out of bed.      Pain: 0/10 on VAS scale  Pain location: none today     OBJECTIVE      INTERNAL ASSESSMENT: patient verbally consented to internal vaginal assessment  Pain: none   Sensation: WNL  Vaginal vault: WNL   Muscle Bulk: WFL   Muscle Power: 0/5  Muscle Endurance: <1 sec  # Reps To Fatigue: 2    Fast Contractions: 0                Quality of contraction: excessive accessory muscle use. Poor pelvic floor muscle activation.    Specificity: patient contracts: glutes and adductors   Coordination: unable to contract pelvic floor appropriately   Prolapse check: grade 2 excursion noted  Comments: none        Therapeutic Activity to develop pain management for 40 minutes including:   Education and planning regarding functional changes to address pain: appropriate muscle warm up at home, diaphragmatic breathing breathing during increased pain, grading exposure to pain stimulus to reduce overall  reaction, anatomy and function of pelvic floor during ADLs, and plan of care progression.Discussed tools that my help manage pain: Oh Nuts.     Neuromuscular Re-education to develop muscular downtraining for 26 minutes including:  bearing down with abdominal release and diaphragmatic breathing  Diaphragmatic breathing done with manual pressure from therapist        Education: Discussed progression of plan of care with patient; educated pt in activity modification; reviewed HEP with pt. Pt demonstrated and verbalized understanding of all instruction and was not provided with a handout of HEP (see Patient Instructions). Will continue with previous HEP.         ASSESSMENT       Pt tolerated entire treatment well with no visible adverse effects.   Will the patient continue to benefit from skilled PT intervention? Yes  Medical necessity demonstrated by: continued pain with ADLs  Progress towards goals:  satisfactory  New/Revised goals: continue with current goals        PLAN      Continue with established Plan of Care, working toward established PT goals.  Plan to begin pelvic floor strength and coordination at next session.

## 2023-02-13 NOTE — PROGRESS NOTES
Outpatient Pelvic Health Physical Therapy Daily Note       Patient Name: Sayra Pearson  Clinic Number: [id number]    Therapy Diagnosis:   Encounter Diagnoses   Name Primary?    Female dyspareunia Yes    Lack of coordination     Muscle hypertonicity     Muscle weakness      Physician: Susana Zavala MD    Visit Date: 2/13/2023    Physician Orders: Physical Therapy evaluate and treat  Medical Diagnosis: female dyspareunia  Evaluation Date: 2/7/2023  Authorization Period Expiration: 3/7/2023  Plan of Care Certification Period: 2/7/2023 - 5/7/2023  Visit #/Visits authorized: 1/ 11 (plus evaluation)     Time In: 9:03  Time Out: 10:09  Total Billable Time: 66 minutes    Precautions: Standard    SUBJECTIVE   Pt reports: that her ribs are feeling better. Has not attempted intercourse so no changes report.   She was compliant with home exercise program.  Response to previous treatment: good  Functional change: Reports that her ribs are feeling a lot better. Is having an easier time with getting in and out of bed.     Pain: 0/10 on VAS scale  Pain location: none today    OBJECTIVE     INTERNAL ASSESSMENT: patient verbally consented to internal vaginal assessment  Pain: none   Sensation: WNL  Vaginal vault: WNL   Muscle Bulk: WFL   Muscle Power: 0/5  Muscle Endurance: <1 sec  # Reps To Fatigue: 2    Fast Contractions: 0     Quality of contraction: excessive accessory muscle use. Poor pelvic floor muscle activation.    Specificity: patient contracts: glutes and adductors   Coordination: unable to contract pelvic floor appropriately   Prolapse check: grade 2 excursion noted  Comments: none      Therapeutic Activity to develop pain management for 40 minutes including:   Education and planning regarding functional changes to address pain: appropriate muscle warm up at home, diaphragmatic breathing breathing during increased pain, grading exposure to pain stimulus to reduce overall reaction, anatomy and function of  pelvic floor during ADLs, and plan of care progression.Discussed tools that my help manage pain: Oh Nuts.    Neuromuscular Re-education to develop muscular downtraining for 26 minutes including:  bearing down with abdominal release and diaphragmatic breathing  Diaphragmatic breathing done with manual pressure from therapist      Education: Discussed progression of plan of care with patient; educated pt in activity modification; reviewed HEP with pt. Pt demonstrated and verbalized understanding of all instruction and was not provided with a handout of HEP (see Patient Instructions). Will continue with previous HEP.       ASSESSMENT      Pt tolerated entire treatment well with no visible adverse effects.   Will the patient continue to benefit from skilled PT intervention? Yes  Medical necessity demonstrated by: continued pain with ADLs  Progress towards goals:  satisfactory  New/Revised goals: continue with current goals      PLAN     Continue with established Plan of Care, working toward established PT goals.  Plan to begin pelvic floor strength and coordination at next session.

## 2023-02-22 ENCOUNTER — CLINICAL SUPPORT (OUTPATIENT)
Dept: REHABILITATION | Facility: HOSPITAL | Age: 58
End: 2023-02-22
Attending: OBSTETRICS & GYNECOLOGY
Payer: MEDICARE

## 2023-02-22 DIAGNOSIS — R27.9 LACK OF COORDINATION: Primary | ICD-10-CM

## 2023-02-22 DIAGNOSIS — M62.89 MUSCLE HYPERTONICITY: ICD-10-CM

## 2023-02-22 DIAGNOSIS — M62.81 MUSCLE WEAKNESS: ICD-10-CM

## 2023-02-22 PROCEDURE — 97112 NEUROMUSCULAR REEDUCATION: CPT | Mod: PN

## 2023-02-22 NOTE — PROGRESS NOTES
Outpatient Pelvic Health Physical Therapy Daily Note       Patient Name: Sayra Pearson  Clinic Number: [id number]    Therapy Diagnosis:   Encounter Diagnoses   Name Primary?    Lack of coordination Yes    Muscle hypertonicity     Muscle weakness        Physician: Susana Zavala MD    Visit Date: 2/22/2023    Physician Orders: Physical Therapy evaluate and treat  Medical Diagnosis: female dyspareunia  Evaluation Date: 2/7/2023  Authorization Period Expiration: 3/7/2023  Plan of Care Certification Period: 2/7/2023 - 5/7/2023  Visit #/Visits authorized: 2/ 11 (plus evaluation)     Time In: 8:54  Time Out: 9:24  Total Billable Time: 30 minutes    Precautions: Standard    SUBJECTIVE   Pt reports: She had not attempted intercourse so no changes to report in terms of pain. Is not longer having any rib pain. Reports that when her bladder is full, she does have some leakage.   She was compliant with home exercise program.  Response to previous treatment: good  Functional change: Reports that her ribs are feeling a lot better.    Pain: 0/10 on VAS scale  Pain location: none today    OBJECTIVE     No objective measures taken today.       Therapeutic Activity to develop pain management for 00 minutes including:   Education and planning regarding functional changes to address pain: appropriate muscle warm up at home, diaphragmatic breathing breathing during increased pain, grading exposure to pain stimulus to reduce overall reaction, anatomy and function of pelvic floor during ADLs, and plan of care progression.Discussed tools that my help manage pain: Oh Nuts.    Neuromuscular Re-education to develop muscular downtraining for 30 minutes including:  bearing down with abdominal release and diaphragmatic breathing  Diaphragmatic breathing done with external manual pressure from therapist  Manuelgels with diaphragmatic breathing   All performed in supine with external manual palpation from therapist and in sitting on a  towel roll to improve proprioception.       Education: Discussed progression of plan of care with patient; educated pt in activity modification; reviewed HEP with pt. Pt demonstrated and verbalized understanding of all instruction and was not provided with a handout of HEP (see Patient Instructions). Will continue with supine diaphragmatic breathing for 3-5 minutes daily. Will add pelvic floor muscle contractions/relaxations with diaphragmatic breathing in sitting x30 daily.       ASSESSMENT    Patient had some moderate difficulty coordinating pelvic floor muscles contractions assessed via external palpation with clothes on. Improved coordination after cuing to breath out with an open mouth. In sitting patient had improved coordination sitting in chair with back against the chair. Patient needed to leave early to attend to family matters.   Pt tolerated entire treatment well with no visible adverse effects.   Will the patient continue to benefit from skilled PT intervention? Yes  Medical necessity demonstrated by: continued pain with ADLs  Progress towards goals:  satisfactory  New/Revised goals: continue with current goals      PLAN     Continue with established Plan of Care, working toward established PT goals.  Plan to continue pelvic floor strength and coordination at next session. Pelvic floor muscles release.

## 2023-02-22 NOTE — PATIENT INSTRUCTIONS
"Kegels - start this laying down so that your pelvic floor doesn't have to work against gravity. Start by taking a deep breath in, then as you exhale draw your pelvic floor closed and lift like you are "closing the openings." Hold this contraction at about 40-50% of your maximum potential. Release to a fully relaxed position when you breathe in. Exhale like you are blowing out birthday candles while performing a Kegel to avoid holding your breath and increasing pressure on your abdomen/pelvic floor  Perform 30 reps per day.     KEGELS IN SITTING  1. Sit comfortably with legs and buttocks relaxed.  2. Exhale, Contract and LIFT the pelvic floor muscles as if you're trying to stop the stream of urine and passage of gas or like you are trying to close the openings.  3. Hold LIFT at about 50-60% of your maximum potential.  4. Release the pelvic muscles and you inhale.  5. Repeat 10 times, 3 sets per day. Spread throughout the day.     No Kegels while urinating!     "

## 2023-02-27 ENCOUNTER — CLINICAL SUPPORT (OUTPATIENT)
Dept: REHABILITATION | Facility: HOSPITAL | Age: 58
End: 2023-02-27
Attending: OBSTETRICS & GYNECOLOGY
Payer: MEDICARE

## 2023-02-27 DIAGNOSIS — M62.81 MUSCLE WEAKNESS: ICD-10-CM

## 2023-02-27 DIAGNOSIS — R27.9 LACK OF COORDINATION: Primary | ICD-10-CM

## 2023-02-27 DIAGNOSIS — M62.89 MUSCLE HYPERTONICITY: ICD-10-CM

## 2023-02-27 PROCEDURE — 97112 NEUROMUSCULAR REEDUCATION: CPT | Mod: PN

## 2023-02-27 NOTE — PROGRESS NOTES
Outpatient Pelvic Health Physical Therapy Daily Note       Patient Name: Sayra Pearson  Clinic Number: [id number]    Therapy Diagnosis:   Encounter Diagnoses   Name Primary?    Lack of coordination Yes    Muscle hypertonicity     Muscle weakness        Physician: Susana Zavala MD    Visit Date: 2/27/2023    Physician Orders: Physical Therapy evaluate and treat  Medical Diagnosis: female dyspareunia  Evaluation Date: 2/7/2023  Authorization Period Expiration: 3/7/2023  Plan of Care Certification Period: 2/7/2023 - 5/7/2023  Visit #/Visits authorized: 3/ 11 (plus evaluation)     Time In: 9:00  Time Out: 9:48  Total Billable Time: 48 minutes    Precautions: Standard    SUBJECTIVE   Pt reports: Reports the new set of exercises is a little harder. Is no longer having any rib pain with transitional movements. Has not attempted intercourse so is not sure how pelvic pain is. Has not had any urinary leakage this past week.     She was compliant with home exercise program.  Response to previous treatment: good  Functional change: Reports that her ribs are feeling a lot better.    Pain: 0/10 on VAS scale  Pain location: none today    OBJECTIVE     INTERNAL ASSESSMENT- Patient gave verbal consent for internal treatment.   Pain: none   Sensation: WNL  Vaginal vault: WNL   Muscle Bulk: hypertonus at layer 1 +1  Muscle Power: 2/5  Muscle Endurance: 3 sec  Fast Contractions: 0     Quality of contraction: slow rise and slow relaxation   Specificity: patient contracts: glutes and adductors but able to stop with proper cues. Was bearing down with contraction slightly before corrective cues.   Coordination: tends to hold breath during PFM contration       Therapeutic Activity to develop pain management for 00 minutes including:   Education and planning regarding functional changes to address pain: appropriate muscle warm up at home, diaphragmatic breathing breathing during increased pain, grading exposure to pain stimulus  to reduce overall reaction, anatomy and function of pelvic floor during ADLs, and plan of care progression.Discussed tools that my help manage pain: Oh Nuts.    Neuromuscular Re-education to develop muscular downtraining for 48 minutes including:  diaphragmatic breathing with Kegel in supine and in sitting.       Education: Discussed progression of plan of care with patient; educated pt in activity modification; reviewed HEP with pt. Pt demonstrated and verbalized understanding of all instruction and was not provided with a handout of HEP (see Patient Instructions). Will continue with supine diaphragmatic breathing for 3-5 minutes daily. Will add pelvic floor muscle contractions/relaxations with diaphragmatic breathing in sitting x30 daily.       ASSESSMENT    Patient had some moderate difficulty coordinating pelvic floor muscles contractions assessed via external palpation with clothes on. Improved coordination after cuing to breath out with an open mouth. In sitting patient had improved coordination sitting in chair with back against the chair. Patient needed to leave early to attend to family matters.   Pt tolerated entire treatment well with no visible adverse effects.   Will the patient continue to benefit from skilled PT intervention? Yes  Medical necessity demonstrated by: continued pain with ADLs  Progress towards goals:  satisfactory  New/Revised goals: continue with current goals      PLAN     Continue with established Plan of Care, working toward established PT goals.  Plan to continue pelvic floor strength and coordination at next session. Pelvic floor muscles release.    Mckayla Muñoz, PT

## 2023-03-06 ENCOUNTER — CLINICAL SUPPORT (OUTPATIENT)
Dept: REHABILITATION | Facility: HOSPITAL | Age: 58
End: 2023-03-06
Attending: OBSTETRICS & GYNECOLOGY
Payer: MEDICARE

## 2023-03-06 DIAGNOSIS — M62.81 MUSCLE WEAKNESS: ICD-10-CM

## 2023-03-06 DIAGNOSIS — R27.9 LACK OF COORDINATION: Primary | ICD-10-CM

## 2023-03-06 DIAGNOSIS — M62.89 MUSCLE HYPERTONICITY: ICD-10-CM

## 2023-03-06 PROCEDURE — 97140 MANUAL THERAPY 1/> REGIONS: CPT | Mod: PN

## 2023-03-06 PROCEDURE — 97112 NEUROMUSCULAR REEDUCATION: CPT | Mod: PN

## 2023-03-06 NOTE — PROGRESS NOTES
Outpatient Pelvic Health Physical Therapy Daily Note       Patient Name: Sayra Pearson  Clinic Number: [id number]    Therapy Diagnosis:   Encounter Diagnoses   Name Primary?    Lack of coordination Yes    Muscle hypertonicity     Muscle weakness          Physician: Susana Zavala MD    Visit Date: 3/6/2023    Physician Orders: Physical Therapy evaluate and treat  Medical Diagnosis: female dyspareunia  Evaluation Date: 2/7/2023  Authorization Period Expiration: 3/7/2023  Plan of Care Certification Period: 2/7/2023 - 5/7/2023  Visit #/Visits authorized: 4/ 11 (plus evaluation)     Time In: 9:05  Time Out: 9:55  Total Billable Time: 50 minutes    Precautions: Standard    SUBJECTIVE   Pt reports: Cough is starting to come back and is having more rib pain. Thinks she may be having and RA flare up. Thinks she has been doing HEP correctly. Has not attempted intercourse so is not sure how pelvic pain is. Is waiting for vaginal suppositories to come in before attempting intercourse. HEP does not feel difficult and feels like they are getting a little easier. Had some leakage after last session for a few days that was happening with coughing.     She was compliant with home exercise program.  Response to previous treatment: good  Functional change: Reports that her ribs are feeling a lot better.    Pain: 0/10 on VAS scale  Pain location: none today    OBJECTIVE       Therapeutic Activity to develop pain management for 00 minutes including:   Education and planning regarding functional changes to address pain: appropriate muscle warm up at home, diaphragmatic breathing breathing during increased pain, grading exposure to pain stimulus to reduce overall reaction, anatomy and function of pelvic floor during ADLs, and plan of care progression.Discussed tools that my help manage pain: Oh Nuts.    Neuromuscular Re-education to develop muscular downtraining for 15 minutes including:  diaphragmatic breathing with Kegel  in supine and in sitting.     Manual Therapy to promote tissue mobility for 35 minutes:  Soft tissue mobilization to inferior abdomen  Cupping with small and medium cups to inferior abdomen    Education: Discussed progression of plan of care with patient; educated pt in activity modification; reviewed HEP with pt. Pt demonstrated and verbalized understanding of all instruction and was not provided with a handout of HEP (see Patient Instructions). Will continue with supine diaphragmatic breathing for 3-5 minutes daily. Will add pelvic floor muscle contractions/relaxations with diaphragmatic breathing in sitting x30 daily.       ASSESSMENT    Patient had some mild difficulty coordinating pelvic floor contraction in sitting, was holding breath. Corrected with verbal cues.   Pt tolerated entire treatment well with no visible adverse effects.   Will the patient continue to benefit from skilled PT intervention? Yes  Medical necessity demonstrated by: continued pain with ADLs  Progress towards goals:  satisfactory  New/Revised goals: continue with current goals      PLAN     Continue with established Plan of Care, working toward established PT goals.  Plan to continue pelvic floor strength and coordination at next session. Pelvic floor muscles release.    Mckayla Muñoz, PT

## 2023-03-27 ENCOUNTER — CLINICAL SUPPORT (OUTPATIENT)
Dept: REHABILITATION | Facility: HOSPITAL | Age: 58
End: 2023-03-27
Attending: OBSTETRICS & GYNECOLOGY
Payer: MEDICARE

## 2023-03-27 DIAGNOSIS — R27.9 LACK OF COORDINATION: Primary | ICD-10-CM

## 2023-03-27 DIAGNOSIS — M62.81 MUSCLE WEAKNESS: ICD-10-CM

## 2023-03-27 DIAGNOSIS — M62.89 MUSCLE HYPERTONICITY: ICD-10-CM

## 2023-03-27 PROCEDURE — 97112 NEUROMUSCULAR REEDUCATION: CPT | Mod: PN

## 2023-03-27 PROCEDURE — 97140 MANUAL THERAPY 1/> REGIONS: CPT | Mod: PN

## 2023-03-27 NOTE — PROGRESS NOTES
Outpatient Pelvic Health Physical Therapy Daily Note       Patient Name: Sayra Pearson  Clinic Number: [id number]    Therapy Diagnosis:   Encounter Diagnoses   Name Primary?    Lack of coordination Yes    Muscle hypertonicity     Muscle weakness          Physician: Susana Zavala MD    Visit Date: 3/27/2023    Physician Orders: Physical Therapy evaluate and treat  Medical Diagnosis: female dyspareunia  Evaluation Date: 2/7/2023  Authorization Period Expiration: 3/7/2023  Plan of Care Certification Period: 2/7/2023 - 5/7/2023  Visit #/Visits authorized: 5/ 11 (plus evaluation)     Time In: 8:58  Time Out: 9:44  Total Billable Time: 46 minutes    Precautions: Standard    SUBJECTIVE   Pt reports: was able to attempt intercourse and pain was not significant. Did have minimal pain with last attempt at intercourse. Has a follow-up appointment with physician. Has not been having much leakage as she has not been coughing.     She was compliant with home exercise program.  Response to previous treatment: good  Functional change: Less pain with ADLs.    Pain: 0/10 on VAS scale  Pain location: none today    OBJECTIVE       Therapeutic Activity to develop pain management for 00 minutes including:   Education and planning regarding functional changes to address pain: appropriate muscle warm up at home, diaphragmatic breathing breathing during increased pain, grading exposure to pain stimulus to reduce overall reaction, anatomy and function of pelvic floor during ADLs, and plan of care progression.Discussed tools that my help manage pain: Oh Nuts.    Neuromuscular Re-education to develop muscular downtraining for 16 minutes including:  diaphragmatic breathing with Kegel in supine     Manual Therapy to promote tissue mobility for 30 minutes:  Soft tissue mobilization and tissue release to left pelvic floor all layers  Discussion on self perineal massage    Education: Discussed progression of plan of care with patient;  educated pt in activity modification; reviewed HEP with pt. Pt demonstrated and verbalized understanding of all instruction and was not provided with a handout of HEP (see Patient Instructions). Will continue with supine diaphragmatic breathing for 3-5 minutes daily. Pelvic floor muscle contractions/relaxations with diaphragmatic breathing in sitting x30 daily. Will add 5 minute perineal massage x2 week.       ASSESSMENT      Demonstrated paradoxical pelvic floor muscles contraction in supine. Required significant verbal, tactile, and visual for proper performance of contractions. Pelvic floor muscles tone is neutral to pelvic floor at all locations except superficial 6 o'clock. Patient reported familiar burning pain at this location.     Will the patient continue to benefit from skilled PT intervention? Yes  Medical necessity demonstrated by: continued pain with ADLs  Progress towards goals:  satisfactory  New/Revised goals: continue with current goals       PLAN     Continue with established Plan of Care, working toward established PT goals.    Mckayla Muñoz, PT

## 2023-03-27 NOTE — PATIENT INSTRUCTIONS
Perineal Massage: in order to prep, make sure to:   Wash your hands/trim finger nails   Semi-sitting position with pillows for support (think mid-way between laying flat and sitting up).   Use water-based lubricant   You may want a mirror for viewing to make sure you're doing everything correctly  Perineal Massage procedure:   Use thumb with lubricant   Insert thumb into vagina (pad down) to about the depth of your first knuckle   Gently pull down like you are pulling your vagina to your anus - hold here for about 30 seconds (you may increase the time duration as you get more used to it). Incorporate your diaphragmatic breathing.  If you do feel a slight stretching sensation that should subside as you hold the stretch.   You can gently sweep your thumb from side to side (think about going from 4 to 8 o' clock)   This should not be painful - if so, decrease the intensity of your stretch.  Perform 5 minutes every 2-3 days.

## 2023-04-24 ENCOUNTER — CLINICAL SUPPORT (OUTPATIENT)
Dept: REHABILITATION | Facility: HOSPITAL | Age: 58
End: 2023-04-24
Attending: OBSTETRICS & GYNECOLOGY
Payer: MEDICARE

## 2023-04-24 DIAGNOSIS — M62.81 MUSCLE WEAKNESS: ICD-10-CM

## 2023-04-24 DIAGNOSIS — M62.89 MUSCLE HYPERTONICITY: ICD-10-CM

## 2023-04-24 DIAGNOSIS — R27.9 LACK OF COORDINATION: Primary | ICD-10-CM

## 2023-04-24 PROCEDURE — 97530 THERAPEUTIC ACTIVITIES: CPT | Mod: PN

## 2023-04-24 NOTE — PROGRESS NOTES
Outpatient Pelvic Health Physical Therapy Daily Note and DISCHARGE NOTE       Patient Name: Sayra Pearson  Clinic Number: [id number]    Therapy Diagnosis:   Encounter Diagnoses   Name Primary?    Lack of coordination Yes    Muscle hypertonicity     Muscle weakness          Physician: Susana Zavala MD    Visit Date: 4/24/2023    Physician Orders: Physical Therapy evaluate and treat  Medical Diagnosis: female dyspareunia  Evaluation Date: 2/7/2023  Authorization Period Expiration: 3/7/2023  Plan of Care Certification Period: 2/7/2023 - 5/7/2023  Visit #/Visits authorized: 5/ 11 (plus evaluation)     Time In: 9:04  Time Out: 9:48  Total Billable Time: 44 minutes    Precautions: Standard    SUBJECTIVE   Pt reports: Has not been seen for the last month. Was not able to be seen for physician follow-up due to scheduling error. Feels like she has improved 70-80% since starting Physical Therapy. Feels like she is ready to DC to independent HEP. When she uses vaginal suppositories pain will be 1/10, without pain will be up to 6/10.     She was compliant with home exercise program.  Response to previous treatment: good  Functional change: Less pain with ADLs.    Pain: 0/10 on VAS scale  Pain location: none today    OBJECTIVE     INTERNAL ASSESSMENT  Pain: none   Sensation: WNL  Vaginal vault: WNL   Muscle Bulk: WFL   Muscle Power: 2/5     Quality of contraction: slow rise   Specificity: patient contracts: adductors and hip extensors   Coordination: tends to hold breath during PFM contration   Prolapse check:DNA      TREATMENT     Therapeutic Activity to develop pain management for 44 minutes including:   Education and planning regarding functional changes to address pain: appropriate muscle warm up at home, diaphragmatic breathing breathing during increased pain, grading exposure to pain stimulus to reduce overall reaction, anatomy and function of pelvic floor during ADLs, and plan of care progression.Discussed  tools that my help manage pain: Oh Nuts and pelvic wand. Objective Measures taken.     Home Exercise Program: Discussed progression of plan of care with patient; educated pt in activity modification; reviewed HEP with pt. Pt demonstrated and verbalized understanding of all instruction and was not provided with a handout of HEP (see Patient Instructions). Will continue with supine diaphragmatic breathing for 3-5 minutes daily. Pelvic floor muscle contractions/relaxations with diaphragmatic breathing in sitting x30 daily. Will add 5 minute perineal massage x2 week. Discussed using pelvic wand for massage.      ASSESSMENT      Patient tolerated treatment well. Demonstrates improved pelvic floor muscles strength and coordination.     Progress towards goals:  satisfactory  New/Revised goals: continue with current goals     Short Term Goals: 4 weeks   Pt will verbalize improved awareness of PFM activity as palpated by PT in order to improve activity involvement with HEP. MET  Pt will report improved ability to engage pelvic/abdominal brace with < or = 2/10 pain for improved tolerance of functional transfers/mobility. MET   Pt/family will be independent with HEP for continued self-management of symptoms. MET     Long Term Goals: 12 weeks   Pt will report successfully having intercourse with < or = 2/10 pain for an improvement in activity tolerance. MET  Pt will demonstrate gross lower extremity strength and core strength of 4/5 or greater to improve functional tolerance. MET    PLAN     Plan to discharge to independent HEP.     Mckayla Muñoz, PT

## 2023-06-22 ENCOUNTER — TELEPHONE (OUTPATIENT)
Dept: OBSTETRICS AND GYNECOLOGY | Facility: CLINIC | Age: 58
End: 2023-06-22
Payer: MEDICARE

## 2023-06-22 NOTE — TELEPHONE ENCOUNTER
----- Message from Anna Jrodan sent at 6/22/2023 11:21 AM CDT -----  Regarding: Concern And Questions  Name of Who is Calling:  Patient          What is the request in detail:  Patient would like to speak with someone about an appointment that was cancelled 03/29/2023            Can the clinic reply by [facility]: No            What Number to Call Back if not in [facility]: [phone]        
Per Dr Zavala patient was to follow up in 3 months is symptoms did not improve or got worst. Patient states she just needs to get her script refilled. Will assist with refill.  
Single

## 2023-06-27 ENCOUNTER — TELEPHONE (OUTPATIENT)
Dept: OBSTETRICS AND GYNECOLOGY | Facility: CLINIC | Age: 58
End: 2023-06-27
Payer: MEDICARE

## 2023-06-27 DIAGNOSIS — N95.1 MENOPAUSAL SYNDROME: ICD-10-CM

## 2023-06-27 DIAGNOSIS — N95.2 POSTMENOPAUSAL ATROPHIC VAGINITIS: ICD-10-CM

## 2023-06-27 RX ORDER — ESTRADIOL 0.1 MG/D
1 FILM, EXTENDED RELEASE TRANSDERMAL
Qty: 24 PATCH | Refills: 4 | Status: SHIPPED | OUTPATIENT
Start: 2023-06-29 | End: 2023-06-29 | Stop reason: SDUPTHER

## 2023-06-27 RX ORDER — ESTRADIOL 0.1 MG/G
1 CREAM VAGINAL EVERY OTHER DAY
Qty: 42 G | Refills: 4 | Status: SHIPPED | OUTPATIENT
Start: 2023-06-27

## 2023-06-27 NOTE — TELEPHONE ENCOUNTER
----- Message from Yareli Austin MA sent at 6/27/2023  4:41 PM CDT -----  # [phone]- She's been trying to get her paper rx for the last 2 weeks for her estradot.

## 2023-06-29 ENCOUNTER — TELEPHONE (OUTPATIENT)
Dept: OBSTETRICS AND GYNECOLOGY | Facility: CLINIC | Age: 58
End: 2023-06-29
Payer: MEDICARE

## 2023-06-29 DIAGNOSIS — N95.1 MENOPAUSAL SYNDROME: ICD-10-CM

## 2023-06-29 RX ORDER — ESTRADIOL 0.1 MG/D
1 FILM, EXTENDED RELEASE TRANSDERMAL
Qty: 24 PATCH | Refills: 4 | Status: SHIPPED | OUTPATIENT
Start: 2023-06-29

## 2023-06-29 NOTE — TELEPHONE ENCOUNTER
----- Message from Susana Zavala MD sent at 6/28/2023  5:44 PM CDT -----  Regarding: RE: Urgent Rx  Yes, the testosterone cream can be applied to inner thigh if she would prefer.   ----- Message -----  From: Leilani Galvan LPN  Sent: 6/22/2023  11:47 AM CDT  To: Susana Zavala MD  Subject: RE: Urgent Rx                                    Patient wants to know if she can apply cream to her thigh vs labia.  ----- Message -----  From: Anna Julian  Sent: 6/22/2023  11:30 AM CDT  To: Lucas SCHMITZ Staff  Subject: Urgent Rx                                        Name of Who is Calling:  Patient          What is the request in detail:  Patient stated she has been calling to speak with the doctor or nurse about a Rx she needs to be refilled.Please give her a call. Patient stated she has been calling for 2 weeks            Can the clinic reply by [facility]: No            [facility][phone]

## 2023-06-29 NOTE — TELEPHONE ENCOUNTER
----- Message from Susana Zavala MD sent at 6/28/2023  5:45 PM CDT -----  It was written to be in versabase, please call her pharmacy ?which pharmacy did she get it from.   ----- Message -----  From: Leilani Galvan LPN  Sent: 6/20/2023  11:25 AM CDT  To: Susana Zavala MD    Patient states the pharmacist said for her to get the versa base as it doesn't cause burning and the patch she will need the script mailed to her so she can e-mail it to them.  ----- Message -----  From: Yareli Austin MA  Sent: 6/20/2023   9:38 AM CDT  To: Lucas Meza A Staff    Pt has questions regarding her compound of estradiol and testosterone. The previous rx was burning her. She also needs a written rx for her estra-dot to send to her pharmacy in [place].# [phone]

## 2024-08-26 ENCOUNTER — OFFICE VISIT (OUTPATIENT)
Dept: OBSTETRICS AND GYNECOLOGY | Facility: CLINIC | Age: 59
End: 2024-08-26
Payer: MEDICARE

## 2024-08-26 VITALS
SYSTOLIC BLOOD PRESSURE: 112 MMHG | WEIGHT: 126.56 LBS | BODY MASS INDEX: 21.09 KG/M2 | HEIGHT: 65 IN | DIASTOLIC BLOOD PRESSURE: 79 MMHG

## 2024-08-26 DIAGNOSIS — N95.2 POSTMENOPAUSAL ATROPHIC VAGINITIS: ICD-10-CM

## 2024-08-26 DIAGNOSIS — Z01.419 ENCOUNTER FOR GYNECOLOGICAL EXAMINATION WITHOUT ABNORMAL FINDING: Primary | ICD-10-CM

## 2024-08-26 DIAGNOSIS — N95.1 MENOPAUSAL SYNDROME: ICD-10-CM

## 2024-08-26 PROCEDURE — 3074F SYST BP LT 130 MM HG: CPT | Mod: CPTII,S$GLB,, | Performed by: OBSTETRICS & GYNECOLOGY

## 2024-08-26 PROCEDURE — 3078F DIAST BP <80 MM HG: CPT | Mod: CPTII,S$GLB,, | Performed by: OBSTETRICS & GYNECOLOGY

## 2024-08-26 PROCEDURE — G0101 CA SCREEN;PELVIC/BREAST EXAM: HCPCS | Mod: GZ,S$GLB,, | Performed by: OBSTETRICS & GYNECOLOGY

## 2024-08-26 PROCEDURE — 1160F RVW MEDS BY RX/DR IN RCRD: CPT | Mod: CPTII,S$GLB,, | Performed by: OBSTETRICS & GYNECOLOGY

## 2024-08-26 PROCEDURE — 99999 PR PBB SHADOW E&M-EST. PATIENT-LVL III: CPT | Mod: PBBFAC,,, | Performed by: OBSTETRICS & GYNECOLOGY

## 2024-08-26 PROCEDURE — 1159F MED LIST DOCD IN RCRD: CPT | Mod: CPTII,S$GLB,, | Performed by: OBSTETRICS & GYNECOLOGY

## 2024-08-26 RX ORDER — ESTRADIOL 0.1 MG/D
1 FILM, EXTENDED RELEASE TRANSDERMAL
Qty: 24 PATCH | Refills: 4 | Status: SHIPPED | OUTPATIENT
Start: 2024-08-26 | End: 2024-08-26

## 2024-08-26 RX ORDER — ESTRADIOL 0.1 MG/D
1 FILM, EXTENDED RELEASE TRANSDERMAL
Qty: 24 PATCH | Refills: 4 | Status: SHIPPED | OUTPATIENT
Start: 2024-08-26

## 2024-08-26 RX ORDER — ESTRADIOL 0.1 MG/G
1 CREAM VAGINAL EVERY OTHER DAY
Qty: 42 G | Refills: 4 | Status: SHIPPED | OUTPATIENT
Start: 2024-08-26

## 2024-08-26 NOTE — PROGRESS NOTES
Subjective:       Patient ID: Sayra Pearson is a 59 y.o. female.    Chief Complaint:  Well Woman and Gynecologic Exam      History of Present Illness  Gynecologic Exam  Pertinent negatives include no abdominal pain, back pain or headaches.     Sayra Pearson is a 59 y.o. female  here for her annual GYN exam.     She is menopausal since age 51, had a hysterectomy in . She is doing well on her current HRT of Vivelle Dot 0.1mg twice weekly.   No longer has hot flashes. She stopped using her vaginal testosterone cream, and also stopped the vaginal estradiol cream, but wants to resume the estradiol cream for bladder health.  States has been using [employer] with good improvement if vaginal moisture/function.  She notes some perianal tearing(we discussed use of vaginal estradiol to improve mucosal health of the genital region)  denies vaginal itching or irritation.  Denies vaginal discharge.  She is sexually active. She has had1 partner for the past 16 years .   History of abnormal pap: No  Last Pap:  had a hysterectomy  Last MMG: normal-2023: ACR BI-RADS Category 1: Negative -routine follow-up in 12 months  Last Colonoscopy:  colonoscopy a few years ago without abnormalities.  denies domestic violence. She does feel safe at home.     Past Medical History:   Diagnosis Date    Fibromyalgia     Kidney stone     history    Rheumatoid arthritis     Rheumatoid arthritis      Past Surgical History:   Procedure Laterality Date     SECTION      x1     FOOT SURGERY  2014    HAND FUSION      Right thumb fusion    [facility]ST[facility]CTOMY  2011    [facility]    MYOMECTOMY  age 41    TONSILLECTOMY  as an infant     Social History     Socioeconomic History    Marital status:    Tobacco Use    Smoking status: Never    Smokeless tobacco: Never   Substance and Sexual Activity    Alcohol use: Yes     Comment: social    Drug use: No    Sexual activity: Yes     Partners: Male     Birth  "control/protection: Surgical     Comment:  since      Social Determinants of Health     Financial Resource Strain: Low Risk  (2024)    Overall Financial Resource Strain (CARDIA)     Difficulty of Paying Living Expenses: Not hard at all   Food Insecurity: No Food Insecurity (2024)    Hunger Vital Sign     Worried About Running Out of Food in the Last Year: Never true     Ran Out of Food in the Last Year: Never true   Physical Activity: Unknown (2024)    Exercise Vital Sign     Days of Exercise per Week: 5 days   Stress: No Stress Concern Present (2024)    [employer] - Occupational Stress Questionnaire     Feeling of Stress : Only a little   Housing Stability: High Risk (2024)    Housing Stability Vital Sign     Unable to Pay for Housing in the Last Year: Yes     Family History   Problem Relation Name Age of Onset    Lung cancer Maternal Grandfather      Diabetes Father      Pulmonary embolism Father      Colon cancer Mother      Hypertension Mother      Osteoporosis Mother      Breast cancer Neg Hx      Eclampsia Neg Hx      Miscarriages / Stillbirths Neg Hx      Ovarian cancer Neg Hx       labor Neg Hx      Stroke Neg Hx       OB History          3    Para   1    Term   1       0    AB   2    Living   1         SAB   2    IAB   0    Ectopic   0    Multiple   0    Live Births   1                 /79   Ht 5' 5" (1.651 m)   Wt 57.4 kg (126 lb 8.7 oz)   LMP  (LMP Unknown)   BMI 21.06 kg/m²         GYN & OB History    Date of Last Pap: No result found    OB History    Para Term  AB Living   3 1 1 0 2 1   SAB IAB Ectopic Multiple Live Births   2 0 0 0 1      # Outcome Date GA Lbr Anthony/2nd Weight Sex Type Anes PTL Lv   3 SAB            2 SAB            1 Term      CS-LTranv   CINDY       Review of Systems  Review of Systems   Constitutional:  Negative for activity change, appetite change, fatigue and unexpected " weight change.   HENT: Negative.     Eyes:  Negative for visual disturbance.   Respiratory:  Negative for shortness of breath and wheezing.    Cardiovascular:  Negative for chest pain, palpitations and leg swelling.   Gastrointestinal:  Negative for abdominal pain, bloating and blood in stool.   Endocrine: Negative for diabetes and hair loss.   Genitourinary:  Positive for vaginal dryness. Negative for decreased libido and dyspareunia.   Musculoskeletal:  Negative for back pain and joint swelling.   Integumentary:  Negative for acne, hair changes and nipple discharge.   Neurological:  Negative for headaches.   Hematological:  Does not bruise/bleed easily.   Psychiatric/Behavioral:  Negative for depression and sleep disturbance. The patient is not nervous/anxious.    Breast: Negative for mastodynia and nipple discharge          Objective:      Physical Exam:   Constitutional: She is oriented to person, place, and time. She appears well-developed and well-nourished.    HENT:   Head: Normocephalic and atraumatic.    Eyes: Pupils are equal, round, and reactive to light. EOM are normal.     Cardiovascular:  Normal rate and regular rhythm.             Pulmonary/Chest: Effort normal and breath sounds normal.   BREASTS:  no mass, no tenderness, no deformity and no retraction. Right breast exhibits no inverted nipple, no mass, no nipple discharge, no skin change, no tenderness, no bleeding and no swelling. Left breast exhibits no inverted nipple, no mass, no nipple discharge, no skin change, no tenderness, no bleeding and no swelling. Breasts are symmetrical.              Abdominal: Soft. Bowel sounds are normal.     Genitourinary:    Pelvic exam was performed with patient supine.      Genitourinary Comments: PELVIC: Normal external female genitalia without lesions. Normal hair distribution. Adequate perineal body, normal urethral meatus. Vagina Mildly atrophic without lesions or discharge. No significant cystocele or  rectocele. Bimanual exam shows uterus and cervix to be surgically absent. Adnexa without masses or tenderness.  RECTAL: Deferred                 Musculoskeletal: Normal range of motion and moves all extremeties.       Neurological: She is alert and oriented to person, place, and time.    Skin: Skin is warm and dry.    Psychiatric: She has a normal mood and affect.              Assessment:        1. Encounter for gynecological examination without abnormal finding    2. Menopausal syndrome    3. Postmenopausal atrophic vaginitis                Plan:      Problem List Items Addressed This Visit    None  Visit Diagnoses       Encounter for gynecological examination without abnormal finding    -  Primary    Menopausal syndrome        Relevant Medications    estradioL (VIVELLE-DOT) 0.1 mg/24 hr PTSW    Postmenopausal atrophic vaginitis        Relevant Medications    estradioL (ESTRACE) 0.01 % (0.1 mg/gram) vaginal cream             Follow up in about 1 year (around 8/26/2025).

## 2024-08-27 ENCOUNTER — TELEPHONE (OUTPATIENT)
Dept: OBSTETRICS AND GYNECOLOGY | Facility: CLINIC | Age: 59
End: 2024-08-27
Payer: MEDICARE

## 2024-08-27 DIAGNOSIS — Z12.31 SCREENING MAMMOGRAM, ENCOUNTER FOR: Primary | ICD-10-CM

## 2024-08-27 NOTE — TELEPHONE ENCOUNTER
----- Message from Manuelchristian Oswaldo sent at 8/27/2024  1:46 PM CDT -----  Name of Who is Calling:KIM MENDEZ [[id number]]             What is the request in detail: Pt states she needs an order for a mammogram with [facility] in [place]. Please contact to further discuss and advise.     [phone] - Fax    [facility]  [address] [place] [address] C  [place] [id number]           Can the clinic reply by [facility]:No             What Number to Call Back if not in [facility]:   [phone]

## 2024-08-27 NOTE — TELEPHONE ENCOUNTER
Called patient to inform that her mammogram requisition has been faxed to [phone]. Patient said thanks.